# Patient Record
Sex: MALE | Race: OTHER | NOT HISPANIC OR LATINO | Employment: FULL TIME | URBAN - METROPOLITAN AREA
[De-identification: names, ages, dates, MRNs, and addresses within clinical notes are randomized per-mention and may not be internally consistent; named-entity substitution may affect disease eponyms.]

---

## 2017-02-17 ENCOUNTER — HOSPITAL ENCOUNTER (OUTPATIENT)
Dept: NON INVASIVE DIAGNOSTICS | Facility: HOSPITAL | Age: 62
Discharge: HOME | End: 2017-02-17
Attending: INTERNAL MEDICINE
Payer: COMMERCIAL

## 2017-02-17 ENCOUNTER — TRANSCRIBE ORDERS (OUTPATIENT)
Dept: ADMINISTRATIVE | Facility: HOSPITAL | Age: 62
End: 2017-02-17

## 2017-02-17 DIAGNOSIS — E11.00 TYPE 2 DIABETES MELLITUS WITH HYPEROSMOLARITY WITHOUT COMA, UNSPECIFIED LONG TERM INSULIN USE STATUS: ICD-10-CM

## 2017-02-17 DIAGNOSIS — I10 ESSENTIAL HYPERTENSION, MALIGNANT: ICD-10-CM

## 2017-02-17 DIAGNOSIS — E78.5 HYPERLIPIDEMIA, UNSPECIFIED HYPERLIPIDEMIA TYPE: ICD-10-CM

## 2017-02-17 DIAGNOSIS — E78.5 HYPERLIPIDEMIA, UNSPECIFIED HYPERLIPIDEMIA TYPE: Primary | ICD-10-CM

## 2017-02-17 LAB
ATRIAL RATE: 50 BPM
P AXIS: 38 DEGREES
PR INTERVAL: 160 MS
QRS AXIS: 0 DEGREES
QRSD INTERVAL: 96 MS
QT INTERVAL: 460 MS
QTC INTERVAL: 419 MS
T WAVE AXIS: 17 DEGREES
VENTRICULAR RATE: 50 BPM

## 2017-02-17 PROCEDURE — 93005 ELECTROCARDIOGRAM TRACING: CPT

## 2017-11-07 ENCOUNTER — HOSPITAL ENCOUNTER (OUTPATIENT)
Dept: RADIOLOGY | Facility: HOSPITAL | Age: 62
Discharge: HOME | End: 2017-11-07
Attending: INTERNAL MEDICINE
Payer: COMMERCIAL

## 2017-11-07 ENCOUNTER — TRANSCRIBE ORDERS (OUTPATIENT)
Dept: ADMINISTRATIVE | Facility: HOSPITAL | Age: 62
End: 2017-11-07

## 2017-11-07 ENCOUNTER — APPOINTMENT (OUTPATIENT)
Dept: LAB | Facility: HOSPITAL | Age: 62
End: 2017-11-07
Attending: INTERNAL MEDICINE
Payer: COMMERCIAL

## 2017-11-07 DIAGNOSIS — R10.31 ABDOMINAL PAIN, RIGHT LOWER QUADRANT: ICD-10-CM

## 2017-11-07 DIAGNOSIS — K51.919 MILD CHRONIC ULCERATIVE COLITIS WITH COMPLICATION (HCC): ICD-10-CM

## 2017-11-07 DIAGNOSIS — K51.90 MILD CHRONIC ULCERATIVE COLITIS WITHOUT COMPLICATION (HCC): ICD-10-CM

## 2017-11-07 DIAGNOSIS — R10.31 ABDOMINAL PAIN, RIGHT LOWER QUADRANT: Primary | ICD-10-CM

## 2017-11-07 LAB
ALBUMIN SERPL BCP-MCNC: 3.7 G/DL (ref 3.5–5)
ALP SERPL-CCNC: 63 U/L (ref 46–116)
ALT SERPL W P-5'-P-CCNC: 23 U/L (ref 12–78)
ANION GAP SERPL CALCULATED.3IONS-SCNC: 7 MMOL/L (ref 4–13)
AST SERPL W P-5'-P-CCNC: 17 U/L (ref 5–45)
BACTERIA UR QL AUTO: ABNORMAL /HPF
BASOPHILS # BLD AUTO: 0.02 THOUSANDS/ΜL (ref 0–0.1)
BASOPHILS NFR BLD AUTO: 0 % (ref 0–1)
BILIRUB SERPL-MCNC: 0.79 MG/DL (ref 0.2–1)
BILIRUB UR QL STRIP: NEGATIVE
BUN SERPL-MCNC: 18 MG/DL (ref 5–25)
CALCIUM SERPL-MCNC: 9.1 MG/DL (ref 8.3–10.1)
CHLORIDE SERPL-SCNC: 102 MMOL/L (ref 100–108)
CLARITY UR: ABNORMAL
CO2 SERPL-SCNC: 32 MMOL/L (ref 21–32)
COLOR UR: YELLOW
CREAT SERPL-MCNC: 1.27 MG/DL (ref 0.6–1.3)
EOSINOPHIL # BLD AUTO: 0.35 THOUSAND/ΜL (ref 0–0.61)
EOSINOPHIL NFR BLD AUTO: 5 % (ref 0–6)
ERYTHROCYTE [DISTWIDTH] IN BLOOD BY AUTOMATED COUNT: 13.1 % (ref 11.6–15.1)
GFR SERPL CREATININE-BSD FRML MDRD: 61 ML/MIN/1.73SQ M
GLUCOSE SERPL-MCNC: 85 MG/DL (ref 65–140)
GLUCOSE UR STRIP-MCNC: NEGATIVE MG/DL
HCT VFR BLD AUTO: 44.8 % (ref 36.5–49.3)
HGB BLD-MCNC: 15.3 G/DL (ref 12–17)
HGB UR QL STRIP.AUTO: ABNORMAL
HYALINE CASTS #/AREA URNS LPF: ABNORMAL /LPF
KETONES UR STRIP-MCNC: NEGATIVE MG/DL
LEUKOCYTE ESTERASE UR QL STRIP: ABNORMAL
LYMPHOCYTES # BLD AUTO: 2.02 THOUSANDS/ΜL (ref 0.6–4.47)
LYMPHOCYTES NFR BLD AUTO: 28 % (ref 14–44)
MCH RBC QN AUTO: 31.3 PG (ref 26.8–34.3)
MCHC RBC AUTO-ENTMCNC: 34.2 G/DL (ref 31.4–37.4)
MCV RBC AUTO: 92 FL (ref 82–98)
MONOCYTES # BLD AUTO: 0.71 THOUSAND/ΜL (ref 0.17–1.22)
MONOCYTES NFR BLD AUTO: 10 % (ref 4–12)
NEUTROPHILS # BLD AUTO: 4.04 THOUSANDS/ΜL (ref 1.85–7.62)
NEUTS SEG NFR BLD AUTO: 57 % (ref 43–75)
NITRITE UR QL STRIP: NEGATIVE
NON-SQ EPI CELLS URNS QL MICRO: ABNORMAL /HPF
NRBC BLD AUTO-RTO: 0 /100 WBCS
PH UR STRIP.AUTO: 5.5 [PH] (ref 4.5–8)
PLATELET # BLD AUTO: 220 THOUSANDS/UL (ref 149–390)
PMV BLD AUTO: 10.8 FL (ref 8.9–12.7)
POTASSIUM SERPL-SCNC: 3.4 MMOL/L (ref 3.5–5.3)
PROT SERPL-MCNC: 8 G/DL (ref 6.4–8.2)
PROT UR STRIP-MCNC: NEGATIVE MG/DL
RBC # BLD AUTO: 4.89 MILLION/UL (ref 3.88–5.62)
RBC #/AREA URNS AUTO: ABNORMAL /HPF
SODIUM SERPL-SCNC: 141 MMOL/L (ref 136–145)
SP GR UR STRIP.AUTO: 1.02 (ref 1–1.03)
UROBILINOGEN UR QL STRIP.AUTO: 0.2 E.U./DL
WBC # BLD AUTO: 7.15 THOUSAND/UL (ref 4.31–10.16)
WBC #/AREA URNS AUTO: ABNORMAL /HPF

## 2017-11-07 PROCEDURE — 80053 COMPREHEN METABOLIC PANEL: CPT

## 2017-11-07 PROCEDURE — 36415 COLL VENOUS BLD VENIPUNCTURE: CPT

## 2017-11-07 PROCEDURE — 81001 URINALYSIS AUTO W/SCOPE: CPT | Performed by: INTERNAL MEDICINE

## 2017-11-07 PROCEDURE — 74177 CT ABD & PELVIS W/CONTRAST: CPT

## 2017-11-07 PROCEDURE — 85025 COMPLETE CBC W/AUTO DIFF WBC: CPT

## 2017-11-07 RX ADMIN — IOHEXOL 100 ML: 350 INJECTION, SOLUTION INTRAVENOUS at 19:17

## 2017-11-13 ENCOUNTER — TRANSCRIBE ORDERS (OUTPATIENT)
Dept: ADMINISTRATIVE | Facility: HOSPITAL | Age: 62
End: 2017-11-13

## 2017-11-13 DIAGNOSIS — N13.30 HYDRONEPHROSIS, UNSPECIFIED HYDRONEPHROSIS TYPE: Primary | ICD-10-CM

## 2017-11-14 ENCOUNTER — HOSPITAL ENCOUNTER (OUTPATIENT)
Dept: RADIOLOGY | Facility: HOSPITAL | Age: 62
Discharge: HOME | End: 2017-11-14
Attending: INTERNAL MEDICINE
Payer: COMMERCIAL

## 2017-11-14 DIAGNOSIS — N13.30 HYDRONEPHROSIS, UNSPECIFIED HYDRONEPHROSIS TYPE: ICD-10-CM

## 2017-11-14 PROCEDURE — 76770 US EXAM ABDO BACK WALL COMP: CPT

## 2017-11-20 ENCOUNTER — ALLSCRIPTS OFFICE VISIT (OUTPATIENT)
Dept: OTHER | Facility: OTHER | Age: 62
End: 2017-11-20

## 2017-11-20 LAB
BILIRUB UR QL STRIP: NORMAL
CLARITY UR: NORMAL
COLOR UR: YELLOW
GLUCOSE (HISTORICAL): NORMAL
HGB UR QL STRIP.AUTO: NORMAL
KETONES UR STRIP-MCNC: NORMAL MG/DL
LEUKOCYTE ESTERASE UR QL STRIP: NORMAL
NITRITE UR QL STRIP: NORMAL
PROT UR STRIP-MCNC: NORMAL MG/DL

## 2017-11-21 NOTE — CONSULTS
Assessment    1  Nephrolithiasis (592 0) (N20 0)   2     3  Non-smoker (V49 89) (Z78 9)   4  Social alcohol use (Z78 9)    Plan  Nephrolithiasis, SocHx: Non-smoker    · Urine Dip Non-Automated- POC; Status:Complete - Retrospective By ProtocolAuthorization;   Done: [id number] 02:02PM   Performed: In Office; [phone]; Last Updated Codi Guzman; 11/20/2017 2:03:15 PM;Ordered;SocHx: Sandie Angela By:Nga De Leon; Discussion/Summary  Discussion Summary:   My impression is 6 mm right proximal ureteral calculus  and benefits of cystoscopy with right ureteroscopy, holmium laser lithotripsy, right retrograde pyelography, and right ureteral stent insertion were discussed and reviewed  Informed consent was obtained  The patient understands that based on the proximal location of the stone it is possible that he may require right ureteral stent insertion followed by interval ureteroscopy  The patient was instructed to call if he were to develop worsening flank pain, nausea, vomiting, fever, or chills  Patient's Capacity to Self-Care: Patient is able to Self-Care  Goals and Barriers: The patient has the current Goals: To become stone free  The patent has the current Barriers: None  Self Referrals:   Self Referrals: No Ref By  Meng Richter      Chief Complaint  Chief Complaint Free Text Note Form: New patient consult for nephrolithiasis      History of Present Illness  HPI: Emily Syed is a 63year old male with R flank pain that started almost 2 weeks ago  This prompted a CT scan of the abdomen and pelvis performed on November 7, 2017  This revealed a 6 mm right proximal ureteral calculus with right-sided hydronephrosis  Approximately 1 week later a follow-up retroperitoneal ultrasound was obtained which showed the right proximal ureteral calculus within the same position  The patient states that he has occasional right-sided flank pain but that the pain has improved significantly   He denies passing the stone  He denies prior history of nephrolithiasis  medical and surgical history is remarkable for hernia repair, ACL/mcl repair, hypertension, hypercholesterolemia, glucose intolerance  surgical, family, and social histories were reviewed in [employer]  Review of Systems  Complete-Male Urology:  Constitutional: No fever or chills, feels well, no tiredness, no recent weight gain or weight loss  Respiratory: No complaints of shortness of breath, no wheezing, no cough, no SOB on exertion, no orthopnea or PND  Cardiovascular: No complaints of slow heart rate, no fast heart rate, no chest pain, no palpitations, no leg claudication, no lower extremity  Gastrointestinal: No complaints of abdominal pain, no constipation, no nausea or vomiting, no diarrhea or bloody stools  Genitourinary: Empty sensation-- and-- stream quality good, but-- as noted in HPI,-- no dysuria,-- no urinary hesitancy,-- no hematuria,-- no incontinence-- and-- no feelings of urinary urgency--   The patient presents with complaints of 2 episodes of nocturia  Musculoskeletal: No complaints of arthralgia, no myalgias, no joint swelling or stiffness, no limb pain or swelling  Integumentary: No complaints of skin rash or skin lesions, no itching, no skin wound, no dry skin  Hematologic/Lymphatic: No complaints of swollen glands, no swollen glands in the neck, does not bleed easily, no easy bruising  Neurological: No compliants of headache, no confusion, no convulsions, no numbness or tingling, no dizziness or fainting, no limb weakness, no difficulty walking  ROS Reviewed:   ROS reviewed  Active Problems  1  Nephrolithiasis (592 0) (N20 0)    Past Medical History  Active Problems And Past Medical History Reviewed: The active problems and past medical history were reviewed and updated today  Surgical History  Surgical History Reviewed: The surgical history was reviewed and updated today         Family History  Family History Reviewed: The family history was reviewed and updated today  Social History     ·    · Non-smoker (V49 89) (Z78 9)   · Social alcohol use (Z78 9)  Social History Reviewed: The social history was reviewed and updated today  The social history was reviewed and is unchanged  Current Meds   1  Bystolic 10 MG Oral Tablet; Therapy: (Recorded:20Nov2017) to Recorded   2  Chlorthalidone 25 MG Oral Tablet; Therapy: (Recorded:20Nov2017) to Recorded   3  Lipitor 20 MG Oral Tablet; TAKE 1 TABLET DAILY; Therapy: [id number] to (Last Rx:[id number])  Requested for: [id number] Ordered   4  Lisinopril 20 MG Oral Tablet; Therapy: (Recorded:20Nov2017) to Recorded   5  MetFORMIN HCl - 500 MG Oral Tablet; Therapy: (Recorded:20Nov2017) to Recorded   6  NIFEdipine ER Osmotic Release 30 MG Oral Tablet Extended Release 24 Hour; Therapy: (Recorded:20Nov2017) to Recorded  Medication List Reviewed: The medication list was reviewed and updated today  Allergies  1  Penicillins    Vitals  Vital Signs    Recorded: 20Nov2017 01:58PM   Heart Rate 64   Systolic 868   Diastolic 78   Height 5 ft 10 in   Weight 199 lb 7 oz   BMI Calculated 28 62   BSA Calculated 2 08       Physical Exam   Additional Exam:  On examination he is in no acute distress  Lungs are clear  Cardiac is regular  Abdomen is soft nontender nondistended   examination reveals no CVA tenderness  Skin is warm  Extremities without edema   Neurologic is grossly intact and nonfocal  Gait normal  Affect normal       Results/Data  AUA Symptom Score [id number] 02:04PM User, s     Test Name Result Flag Reference   AUA Symptom Score (for prostate disease) 2       Incomplete emptying: Not at all (0) Frequency: Not at all (0) Intermittency: Not at all (0) Urgency: Not at all (0) Weak-stream: Not at all (0) Straining: Not at all (0) Nocturia: Less than half the time (2)   AUA Symptom Score (for prostate disease) - Quality of Life Due to Urinary Symptoms Unhappy AUA Symptom Score (for prostate disease) - Score Category Mild       Urine Dip Non-Automated- POC [id number] 02:02PM [facility]     Test Name Result Flag Reference   Color Yellow       Clarity Transparent     Leukocytes -     Nitrite -     Blood large     Bilirubin -     Protein -     Ketone -     Glucose -       US KIDNEY AND BLADDER [id number] 05:05PM [facility], Provider   Test ordered by: Kem Shepard     Test Name Result Flag Reference   US KIDNEY AND BLADDER (Report)       RENAL ULTRASOUND   INDICATION: N13 30: Unspecified hydronephrosis  History taken directly from the electronic ordering system  Known right ureteral stone  COMPARISON: CT scan 11/7/2017  TECHNIQUE:  Ultrasound of the retroperitoneum was performed with a curvilinear transducer utilizing volumetric sweeps and still imaging techniques  FINDINGS:   KIDNEYS:  Symmetric and normal size  Right kidney: 11 2 cm  Normal echogenicity and contour  No suspicious masses detected  Mild hydronephrosis again identified  No shadowing calculi  No perinephric fluid collections  Left kidney: 11 5 cm  Normal echogenicity and contour  No suspicious masses detected  No hydronephrosis  No shadowing calculi  No perinephric fluid collections  URETERS:  8mm calculus again identified in the mid ureter  BLADDER:   Normally distended  No focal thickening or mass lesions  Bilateral ureteral jets detected  IMPRESSION:   No significant change in the mildly obstructing right midureteral calculus  Workstation performed: [id number]   Signed by:  Katerine Grayson MD  11/16/17     * CT ABDOMEN PELVIS W CONTRAST [id number] 06:09PM [facility], Provider   Test ordered by: Kem Shepard     Test Name Result Flag Reference   CT ABDOMEN PELVIS W CONTRAST (Report)       CT ABDOMEN AND PELVIS WITH IV CONTRAST   INDICATION: Ulcerative colitis   COMPARISON: November 9, 2015   TECHNIQUE: CT examination of the abdomen and pelvis was performed  Reformatted images were created in axial, sagittal, and coronal planes  Radiation dose length product (DLP) for this visit: 547 47 mGy-cm   This examination, like all CT scans performed in the [facility], was performed utilizing techniques to minimize radiation dose exposure, including the use of iterative  reconstruction and automated exposure control  IV Contrast: 100 mL of iohexol (OMNIPAQUE)     Enteric Contrast: Enteric contrast was not administered  FINDINGS:   ABDOMEN   LOWER CHEST: Lingular atelectasis or scarring  Medial right basilar subsegmental atelectasis or scarring  LIVER/BILIARY TREE: Unremarkable  GALLBLADDER: No calcified gallstones  No pericholecystic inflammatory change  SPLEEN: Unremarkable  PANCREAS: Unremarkable  ADRENAL GLANDS: Unremarkable  KIDNEYS/URETERS: 6 mm right proximal ureteral obstructing calculus causing right hydroureteronephrosis  Mild right urothelial thickening could relate to infectious/inflammatory process  STOMACH AND BOWEL: Sigmoid diverticulosis  APPENDIX: No findings to suggest appendicitis  ABDOMINOPELVIC CAVITY: No ascites or free intraperitoneal air  No lymphadenopathy  VESSELS: Unremarkable for patient's age  PELVIS   REPRODUCTIVE ORGANS: Unremarkable for patient's age  URINARY BLADDER: Nondistended and inadequately evaluated  ABDOMINAL WALL/INGUINAL REGIONS: Unremarkable  OSSEOUS STRUCTURES: Lower lumbar spine degenerative changes and bilateral hip joint degenerative changes  No acute fracture or destructive osseous lesion  IMPRESSION:   6 mm right obstructing proximal ureteral calculus causing right hydroureteronephrosis  Mild right urothelial thickening could relate to infectious/inflammatory process  Sigmoid diverticulosis      Workstation performed: [id number]   Signed by:  Darrin Renteria MD  11/7/17       Signatures   Electronically signed by : Terri Monroe MD; Nov 20 2017  2:52PM EST (Author)

## 2017-11-24 RX ORDER — LISINOPRIL 20 MG/1
20 TABLET ORAL DAILY
Status: ON HOLD | COMMUNITY
End: 2018-10-29

## 2017-11-24 RX ORDER — ATORVASTATIN CALCIUM 10 MG/1
10 TABLET, FILM COATED ORAL DAILY
COMMUNITY

## 2017-11-24 RX ORDER — TRAMADOL HYDROCHLORIDE 50 MG/1
50 TABLET ORAL EVERY 6 HOURS PRN
Status: ON HOLD | COMMUNITY
End: 2018-10-29

## 2017-11-24 RX ORDER — NEBIVOLOL 10 MG/1
10 TABLET ORAL DAILY
COMMUNITY

## 2017-11-24 RX ORDER — NIFEDIPINE 30 MG/1
30 TABLET, FILM COATED, EXTENDED RELEASE ORAL DAILY
COMMUNITY

## 2017-11-24 RX ORDER — CHLORTHALIDONE 25 MG/1
25 TABLET ORAL DAILY
COMMUNITY

## 2017-11-24 NOTE — PRE-PROCEDURE INSTRUCTIONS
Pre-Surgery Instructions:   Medication Instructions    Aspirin 162 5 MG CP24 Patient was instructed per "E-Preop"    atorvastatin (LIPITOR) 10 mg tablet Patient was instructed per "E-Preop"    chlorthalidone 25 mg tablet Patient was instructed per "E-Preop"    lisinopril (ZESTRIL) 20 mg tablet Patient was instructed per "E-Preop"    metFORMIN (GLUCOPHAGE) 500 mg tablet Patient was instructed per "E-Preop"    nebivolol (BYSTOLIC) 10 mg tablet Patient was instructed per "E-Preop"    NIFEdipine ER (ADALAT CC) 30 MG 24 hr tablet Patient was instructed per "E-Preop"    traMADol (ULTRAM) 50 mg tablet Patient was instructed per "E-Preop"     Medication Instruction (ACE/ARB - Blood Pressure Medication)    Please do not take this medication on the morning of your day of surgery  Please restart your medications as soon as clinically feasible  Lisinopril 20 MG Oral Tablet            Medication Instruction (Aspirin - Blood Thinners)    Your surgeon may have you stop aspirin up to a week early if you are having intracranial, spine, middle ear, posterior eye or prostate surgery  If not please continue to take this medication on your normal schedule  If you take this in the morning you may do so with a sip of water  aspirin          Medication Instruction (Beta Blocker)    Please continue to take this medication on your normal schedule  If this is an oral medication and you take in the morning, you may do so with a sip of water  Bystolic 10 MG Oral Tablet          Calcium Blocker (Blood Pressure Medication)    Please continue to take this medication on your normal schedule  If this is an oral medication and you take in the morning, you may do so with a sip of water  NIFEdipine          Medication Instruction (Diabetic Medication)    If you are taking long-acting insulin (i e  glargine or Lantus) please only take one-half your usual nighttime dose the night before surgery   If you are taking short-acting insulin or oral diabetes medications, then omit the morning dose the day of surgery  If taking metformin (i e  Glucophage), discontinue this medication for 24 hours prior to surgery  If you have an insulin pump, continue pump the morning of surgery at a basal rate only  metFORMIN        Medication Instruction (Diuretics - Water Pills)    Please continue the following medications up to the evening before surgery, but do not take it on the day of surgery  chlorthalidone          NPO Instructions    Please do not eat or drink anything prior to your surgery as follows: For AM Surgery times = stop at midnight the night before  For PM Surgery times = Midnight to 8AM clear liquids only (Jello, broth, 7up, Sprite, apple juice, black coffee, black tea, Gatorade)  If you are supposed to take any of your medications, do so with a sip of water  Failure to follow these instructions can lead to an increased risk of lung complications and may result in a delay or cancellation of your procedure  If you have any questions, contact your institution for further instructions  Medical Procedure Risk        Medication Instruction (Cholesterol Medication)    Please continue to take this medication on your normal schedule  If this is an oral medication and you take in the morning, you may do so with a sip of water  atorvastatin (Lipitor)          Medication Instruction (Tramadol)    Please continue to take this medication on your normal schedule  If this is an oral medication and you take in the morning, you may do so with a sip of water           traMADol (Ultram)        Accept All Complete All   Last signed: Never    Request Signature   Add New Task Show Removed Tasks

## 2017-11-24 NOTE — PRE-PROCEDURE INSTRUCTIONS
Pre-Surgery Instructions:   Medication Instructions    Aspirin 162 5 MG CP24 Patient was instructed per "E-Preop"    atorvastatin (LIPITOR) 10 mg tablet Patient was instructed per "E-Preop"    chlorthalidone 25 mg tablet Patient was instructed per "E-Preop"    lisinopril (ZESTRIL) 20 mg tablet Patient was instructed per "E-Preop"    metFORMIN (GLUCOPHAGE) 500 mg tablet Patient was instructed per "E-Preop"    nebivolol (BYSTOLIC) 10 mg tablet Patient was instructed per "E-Preop"    NIFEdipine ER (ADALAT CC) 30 MG 24 hr tablet Patient was instructed per "E-Preop"    traMADol (ULTRAM) 50 mg tablet Patient was instructed per "E-Preop"

## 2017-11-28 ENCOUNTER — APPOINTMENT (OUTPATIENT)
Dept: RADIOLOGY | Facility: HOSPITAL | Age: 62
End: 2017-11-28
Attending: UROLOGY
Payer: COMMERCIAL

## 2017-11-28 ENCOUNTER — ANESTHESIA (OUTPATIENT)
Dept: PERIOP | Facility: HOSPITAL | Age: 62
End: 2017-11-28
Payer: COMMERCIAL

## 2017-11-28 ENCOUNTER — HOSPITAL ENCOUNTER (OUTPATIENT)
Facility: HOSPITAL | Age: 62
Setting detail: OUTPATIENT SURGERY
Discharge: HOME | End: 2017-11-28
Attending: UROLOGY | Admitting: UROLOGY
Payer: COMMERCIAL

## 2017-11-28 ENCOUNTER — HOSPITAL ENCOUNTER (OUTPATIENT)
Dept: RADIOLOGY | Facility: HOSPITAL | Age: 62
Discharge: HOME | End: 2017-11-28
Payer: COMMERCIAL

## 2017-11-28 ENCOUNTER — ANESTHESIA EVENT (OUTPATIENT)
Dept: PERIOP | Facility: HOSPITAL | Age: 62
End: 2017-11-28
Payer: COMMERCIAL

## 2017-11-28 VITALS
DIASTOLIC BLOOD PRESSURE: 112 MMHG | TEMPERATURE: 98.3 F | RESPIRATION RATE: 18 BRPM | OXYGEN SATURATION: 98 % | WEIGHT: 199 LBS | SYSTOLIC BLOOD PRESSURE: 198 MMHG | HEIGHT: 71 IN | HEART RATE: 48 BPM | BODY MASS INDEX: 27.86 KG/M2

## 2017-11-28 DIAGNOSIS — N20.0 CALCULUS OF KIDNEY: ICD-10-CM

## 2017-11-28 LAB — GLUCOSE SERPL-MCNC: 92 MG/DL (ref 65–140)

## 2017-11-28 PROCEDURE — 82948 REAGENT STRIP/BLOOD GLUCOSE: CPT

## 2017-11-28 PROCEDURE — 74000 HB X-RAY EXAM OF ABDOMEN (SINGLE ANTEROPOSTERIOR VIEW): CPT

## 2017-11-28 PROCEDURE — C2617 STENT, NON-COR, TEM W/O DEL: HCPCS | Performed by: UROLOGY

## 2017-11-28 PROCEDURE — 88300 SURGICAL PATH GROSS: CPT | Performed by: UROLOGY

## 2017-11-28 PROCEDURE — 74420 UROGRAPHY RTRGR +-KUB: CPT

## 2017-11-28 PROCEDURE — 82360 CALCULUS ASSAY QUANT: CPT | Performed by: UROLOGY

## 2017-11-28 PROCEDURE — 87086 URINE CULTURE/COLONY COUNT: CPT | Performed by: UROLOGY

## 2017-11-28 PROCEDURE — C1769 GUIDE WIRE: HCPCS | Performed by: UROLOGY

## 2017-11-28 DEVICE — STENT URETERAL 6 FR 26CM INLAY OPTIMA: Type: IMPLANTABLE DEVICE | Site: URETER | Status: FUNCTIONAL

## 2017-11-28 RX ORDER — LIDOCAINE HYDROCHLORIDE 10 MG/ML
INJECTION, SOLUTION INFILTRATION; PERINEURAL AS NEEDED
Status: DISCONTINUED | OUTPATIENT
Start: 2017-11-28 | End: 2017-11-28 | Stop reason: SURG

## 2017-11-28 RX ORDER — MEPERIDINE HYDROCHLORIDE 25 MG/ML
25 INJECTION INTRAMUSCULAR; INTRAVENOUS; SUBCUTANEOUS AS NEEDED
Status: DISCONTINUED | OUTPATIENT
Start: 2017-11-28 | End: 2017-11-28 | Stop reason: HOSPADM

## 2017-11-28 RX ORDER — CIPROFLOXACIN 2 MG/ML
400 INJECTION, SOLUTION INTRAVENOUS ONCE
Status: COMPLETED | OUTPATIENT
Start: 2017-11-28 | End: 2017-11-28

## 2017-11-28 RX ORDER — PROPOFOL 10 MG/ML
INJECTION, EMULSION INTRAVENOUS AS NEEDED
Status: DISCONTINUED | OUTPATIENT
Start: 2017-11-28 | End: 2017-11-28 | Stop reason: SURG

## 2017-11-28 RX ORDER — ONDANSETRON 2 MG/ML
INJECTION INTRAMUSCULAR; INTRAVENOUS AS NEEDED
Status: DISCONTINUED | OUTPATIENT
Start: 2017-11-28 | End: 2017-11-28 | Stop reason: SURG

## 2017-11-28 RX ORDER — FENTANYL CITRATE 50 UG/ML
INJECTION, SOLUTION INTRAMUSCULAR; INTRAVENOUS AS NEEDED
Status: DISCONTINUED | OUTPATIENT
Start: 2017-11-28 | End: 2017-11-28 | Stop reason: SURG

## 2017-11-28 RX ORDER — CIPROFLOXACIN 500 MG/1
500 TABLET, FILM COATED ORAL 2 TIMES DAILY
Qty: 10 TABLET | Refills: 0 | Status: SHIPPED | OUTPATIENT
Start: 2017-11-28 | End: 2017-12-03

## 2017-11-28 RX ORDER — HYDROCODONE BITARTRATE AND ACETAMINOPHEN 5; 325 MG/1; MG/1
2 TABLET ORAL EVERY 4 HOURS PRN
Status: DISCONTINUED | OUTPATIENT
Start: 2017-11-28 | End: 2017-11-28 | Stop reason: HOSPADM

## 2017-11-28 RX ORDER — METOCLOPRAMIDE HYDROCHLORIDE 5 MG/ML
10 INJECTION INTRAMUSCULAR; INTRAVENOUS ONCE AS NEEDED
Status: DISCONTINUED | OUTPATIENT
Start: 2017-11-28 | End: 2017-11-28 | Stop reason: HOSPADM

## 2017-11-28 RX ORDER — HYDROCODONE BITARTRATE AND ACETAMINOPHEN 5; 325 MG/1; MG/1
2 TABLET ORAL EVERY 6 HOURS PRN
Qty: 15 TABLET | Refills: 0 | Status: SHIPPED | OUTPATIENT
Start: 2017-11-28 | End: 2017-12-08

## 2017-11-28 RX ORDER — MAGNESIUM HYDROXIDE 1200 MG/15ML
LIQUID ORAL AS NEEDED
Status: DISCONTINUED | OUTPATIENT
Start: 2017-11-28 | End: 2017-11-28 | Stop reason: HOSPADM

## 2017-11-28 RX ORDER — SODIUM CHLORIDE, SODIUM LACTATE, POTASSIUM CHLORIDE, CALCIUM CHLORIDE 600; 310; 30; 20 MG/100ML; MG/100ML; MG/100ML; MG/100ML
20 INJECTION, SOLUTION INTRAVENOUS CONTINUOUS
Status: DISCONTINUED | OUTPATIENT
Start: 2017-11-28 | End: 2017-11-28 | Stop reason: HOSPADM

## 2017-11-28 RX ORDER — FENTANYL CITRATE/PF 50 MCG/ML
25 SYRINGE (ML) INJECTION
Status: DISCONTINUED | OUTPATIENT
Start: 2017-11-28 | End: 2017-11-28 | Stop reason: HOSPADM

## 2017-11-28 RX ORDER — MIDAZOLAM HYDROCHLORIDE 1 MG/ML
INJECTION INTRAMUSCULAR; INTRAVENOUS AS NEEDED
Status: DISCONTINUED | OUTPATIENT
Start: 2017-11-28 | End: 2017-11-28 | Stop reason: SURG

## 2017-11-28 RX ORDER — LISINOPRIL 20 MG/1
20 TABLET ORAL DAILY
Status: COMPLETED | OUTPATIENT
Start: 2017-11-28 | End: 2017-11-28

## 2017-11-28 RX ADMIN — FENTANYL CITRATE 50 MCG: 50 INJECTION, SOLUTION INTRAMUSCULAR; INTRAVENOUS at 17:16

## 2017-11-28 RX ADMIN — SODIUM CHLORIDE, SODIUM LACTATE, POTASSIUM CHLORIDE, AND CALCIUM CHLORIDE: .6; .31; .03; .02 INJECTION, SOLUTION INTRAVENOUS at 17:05

## 2017-11-28 RX ADMIN — ONDANSETRON 4 MG: 2 INJECTION INTRAMUSCULAR; INTRAVENOUS at 17:18

## 2017-11-28 RX ADMIN — LIDOCAINE HYDROCHLORIDE 40 MG: 10 INJECTION, SOLUTION INFILTRATION; PERINEURAL at 17:13

## 2017-11-28 RX ADMIN — HYDROCODONE BITARTRATE AND ACETAMINOPHEN 1 TABLET: 5; 325 TABLET ORAL at 19:06

## 2017-11-28 RX ADMIN — HYDROCODONE BITARTRATE AND ACETAMINOPHEN 1 TABLET: 5; 325 TABLET ORAL at 19:50

## 2017-11-28 RX ADMIN — CIPROFLOXACIN 400 MG: 2 INJECTION, SOLUTION INTRAVENOUS at 16:48

## 2017-11-28 RX ADMIN — LISINOPRIL 20 MG: 20 TABLET ORAL at 20:05

## 2017-11-28 RX ADMIN — FENTANYL CITRATE 25 MCG: 50 INJECTION, SOLUTION INTRAMUSCULAR; INTRAVENOUS at 17:29

## 2017-11-28 RX ADMIN — SODIUM CHLORIDE, SODIUM LACTATE, POTASSIUM CHLORIDE, AND CALCIUM CHLORIDE 20 ML/HR: .6; .31; .03; .02 INJECTION, SOLUTION INTRAVENOUS at 13:07

## 2017-11-28 RX ADMIN — FENTANYL CITRATE 25 MCG: 50 INJECTION, SOLUTION INTRAMUSCULAR; INTRAVENOUS at 17:43

## 2017-11-28 RX ADMIN — MIDAZOLAM HYDROCHLORIDE 2 MG: 1 INJECTION, SOLUTION INTRAMUSCULAR; INTRAVENOUS at 17:05

## 2017-11-28 RX ADMIN — DEXAMETHASONE SODIUM PHOSPHATE 4 MG: 10 INJECTION INTRAMUSCULAR; INTRAVENOUS at 17:18

## 2017-11-28 RX ADMIN — PROPOFOL 200 MG: 10 INJECTION, EMULSION INTRAVENOUS at 17:13

## 2017-11-28 NOTE — ANESTHESIA PREPROCEDURE EVALUATION
Review of Systems/Medical History  Patient summary reviewed  Chart reviewed  No history of anesthetic complications     Cardiovascular  Hyperlipidemia, Hypertension ,    Pulmonary  Negative pulmonary ROS ,        GI/Hepatic  Negative GI/hepatic ROS          Kidney stones,        Endo/Other  Diabetes well controlled Oral agent,      GYN       Hematology  Negative hematology ROS      Musculoskeletal  Negative musculoskeletal ROS        Neurology  Negative neurology ROS      Psychology   Negative psychology ROS            Physical Exam    Airway    Mallampati score: II  TM Distance: >3 FB  Neck ROM: full     Dental   No notable dental hx     Cardiovascular      Pulmonary      Other Findings        Anesthesia Plan  ASA Score- 2       Anesthesia Type- general with ASA Monitors  Additional Monitors:   Airway Plan: LMA  Induction- intravenous  Informed Consent- Anesthetic plan and risks discussed with patient  I personally reviewed this patient with the CRNA  Discussed and agreed on the Anesthesia Plan with the CRNA  Manuela Baig

## 2017-11-28 NOTE — OP NOTE
OPERATIVE REPORT  PATIENT NAME: Esteban Weber    :  1955  MRN: [id number]  Pt Location:  CYSTO ROOM 01    SURGERY DATE: 2017    Surgeon(s) and Role:     Jamison Carrel, MD - Primary    Preop Diagnosis:  Calculus of kidney [N20 0]    Post-Op Diagnosis Codes:     * Calculus of kidney [N20 0]    Procedure(s) (LRB):  CYSTOSCOPY URETEROSCOPY WITH LITHOTRIPSY HOLMIUM LASER, RETROGRADE PYELOGRAM AND INSERTION STENT URETERAL (Right)    Specimen(s):  ID Type Source Tests Collected by Time Destination   1 : right ureteral stone Tissue Other TISSUE EXAM Minda Murphy MD 2017 1740    A :  Urine Urine, Cystoscopic URINE CULTURE Minda Murphy MD 2017 1728        Estimated Blood Loss:   Minimal    Drains:  Right 26-6 Spanish double-J ureteral stent with string in place       Anesthesia Type:   General    Operative Indications:  Calculus of kidney [N20 0]  Calculus right mid ureter    Operative Findings:  6 mm calculus identified and right mid to distal ureter  [address] and removed  Complications:   None      Procedure Description: Esteban Weber is a 63y o -year-old male  with a history of a right 6 mm ureteral calculus  The stone is not well visualized on KUB based on its location overlying the pelvic brim  The patient continues to be symptomatic with right-sided flank pain  Risk and benefits of ureteroscopy were discussed and reviewed  Informed consent was obtained  The patient was brought to the operating room on 2017  After the smooth induction of general LMA anesthesia, the patient was placed in the dorsal lithotomy position  His genitalia was prepped and draped in a sterile fashion  Intravenous antibiotics were administered  A timeout was performed with all members of the operative team confirmed the patient's identity, procedure to be performed, and laterality of the case  A 22 Spanish rigid cystoscope with 30° lens was inserted   The bladder was thoroughly inspected  It was no evidence of mucosal abnormalities or lesions  There were no calculi identified  Attention was focused on the right ureteral orifice  A 5 Congolese open-ended catheter was inserted and a right retrograde pyelogram was performed  A filling defect in the mid to distal right ureter was identified consistent with the stone  A wire was passed proximal to the stone and secured as a safety  A 12 Congolese Schultz catheter was inserted for continuous bladder drainage  A long semirigid ureteroscope was then inserted adjacent to the wire  The stone was identified and was engaged and decimated with a 400 µ holmium laser fiber  The fragments were removed with a dimension ZeroTip basket  The ureteroscope was then repassed multiple times to ensure that the ureter was free and clear of any residual stones  Additional contrast was injected as a roadmap for stent insertion  The ureteroscope and Schultz catheter were then removed  The wire was backloaded through the cystoscope  The cystoscope was repassed into the bladder  A 26-6 Congolese right double-J ureteral stent was then placed without difficulty  The proximal coil was appreciated in the right renal pelvis and the distal coil was visualized within the bladder  The bladder was emptied and the cystoscope was removed  A string was left in place and secured to the dorsum of the phallus with Tegaderm  Overall the patient tolerated the procedure well and were no complications  The patient was extubated in the operating room and transferred to the PACU in stable condition at the conclusion of the case      Patient Disposition:  PACU stable and extubated    SIGNATURE: Harris Peralta MD  DATE: November 28, 2017  TIME: 5:51 PM

## 2017-11-28 NOTE — ANESTHESIA POSTPROCEDURE EVALUATION
Post-Op Assessment Note      CV Status:  Stable    Mental Status:  Awake    Hydration Status:  Stable    PONV Controlled:  Controlled    Airway Patency:  Patent    Post Op Vitals Reviewed: Yes          Staff: Anesthesiologist, CRNA           BP      Temp      Pulse    Resp      SpO2

## 2017-11-28 NOTE — DISCHARGE INSTRUCTIONS
Today you underwent right-sided ureteroscopy  I was able to remove her stone  You have a right ureteral stent in place  On Friday morning, remove the tape, pull the string, and remove the stent  Call for follow-up with Dr Chano Salcido in the next 8-12 weeks  [phone]  [phone]    Ureteroscopy   WHAT YOU NEED TO KNOW:   A ureteroscopy is a procedure to examine in the inside of your urinary tract, which includes your urethra, bladder, ureters, and kidneys  A ureteroscope is a small, thin tube with a light and camera on the end  Ureteroscopy can help your healthcare provider diagnose and treat problems in your urinary tract, such as kidney stones  DISCHARGE INSTRUCTIONS:   Medicine:   · Antibiotics  may be given to treat or prevent an infection  · Take your medicine as directed  Contact your healthcare provider if you think your medicine is not helping or if you have side effects  Tell him or her if you are allergic to any medicine  Keep a list of the medicines, vitamins, and herbs you take  Include the amounts, and when and why you take them  Bring the list or the pill bottles to follow-up visits  Carry your medicine list with you in case of an emergency  Follow up with your healthcare provider as directed:  Write down your questions so you remember to ask them during your visits  Drink liquids as directed  Liquids can help prevent kidney stones and urinary tract infections  Drink water and limit the amount of caffeine you drink  Caffeine may be found in coffee, tea, soda, sports drinks, and foods  Ask your healthcare provider how much liquid to drink each day  Contact your healthcare provider if:   · You have a fever  · You cannot urinate  · You have blood in your urine  · You are vomiting  · You have pain in your abdomen or side  · You have questions or concerns about your condition or care    © 2017 [employer] Information is for End User's use only and may not be sold, redistributed or otherwise used for commercial purposes  All illustrations and images included in CareNotes® are the copyrighted property of [employer]  or [address]  The above information is an  only  It is not intended as medical advice for individual conditions or treatments  Talk to your doctor, nurse or pharmacist before following any medical regimen to see if it is safe and effective for you

## 2017-11-29 NOTE — PERIOPERATIVE NURSING NOTE
Dr Windy Link on call called to notify of bp 206/121, pt in pain, just gave 2nd vicodin, hx hypertension on sveral meds  To give lisinopril 20 mg po now and send pt home to take rest of bp meds   bp prior to d/c 198/112, ok to d/c per dr Alf Santana

## 2017-11-30 ENCOUNTER — LAB REQUISITION (OUTPATIENT)
Dept: LAB | Facility: HOSPITAL | Age: 62
End: 2017-11-30
Payer: COMMERCIAL

## 2017-11-30 DIAGNOSIS — N20.0 CALCULUS OF KIDNEY: ICD-10-CM

## 2017-11-30 LAB — BACTERIA UR CULT: NORMAL

## 2017-12-01 ENCOUNTER — GENERIC CONVERSION - ENCOUNTER (OUTPATIENT)
Dept: OTHER | Facility: OTHER | Age: 62
End: 2017-12-01

## 2017-12-11 LAB
CA PHOS MFR STONE: 3 %
COLOR STONE: NORMAL
COM MFR STONE: 97 %
COMMENT-STONE3: NORMAL
COMPOSITION: NORMAL
LABORATORY COMMENT REPORT: NORMAL
NIDUS STONE QL: NORMAL
PHOTO: NORMAL
SIZE STONE: NORMAL MM
STONE ANALYSIS-IMP: NORMAL
WT STONE: 23.8 MG

## 2018-01-08 ENCOUNTER — TRANSCRIBE ORDERS (OUTPATIENT)
Dept: RADIOLOGY | Facility: HOSPITAL | Age: 63
End: 2018-01-08

## 2018-01-08 ENCOUNTER — TRANSCRIBE ORDERS (OUTPATIENT)
Dept: LAB | Facility: HOSPITAL | Age: 63
End: 2018-01-08

## 2018-01-08 ENCOUNTER — GENERIC CONVERSION - ENCOUNTER (OUTPATIENT)
Dept: OTHER | Facility: OTHER | Age: 63
End: 2018-01-08

## 2018-01-08 ENCOUNTER — HOSPITAL ENCOUNTER (OUTPATIENT)
Dept: RADIOLOGY | Facility: HOSPITAL | Age: 63
Discharge: HOME | End: 2018-01-08
Attending: INTERNAL MEDICINE
Payer: COMMERCIAL

## 2018-01-08 ENCOUNTER — APPOINTMENT (OUTPATIENT)
Dept: LAB | Facility: HOSPITAL | Age: 63
End: 2018-01-08
Attending: INTERNAL MEDICINE
Payer: COMMERCIAL

## 2018-01-08 DIAGNOSIS — M54.17 LUMBOSACRAL RADICULOPATHY: Primary | ICD-10-CM

## 2018-01-08 DIAGNOSIS — E78.5 HYPERLIPIDEMIA, UNSPECIFIED HYPERLIPIDEMIA TYPE: ICD-10-CM

## 2018-01-08 DIAGNOSIS — E78.5 HYPERLIPIDEMIA, UNSPECIFIED HYPERLIPIDEMIA TYPE: Primary | ICD-10-CM

## 2018-01-08 DIAGNOSIS — M54.17 LUMBOSACRAL RADICULOPATHY: ICD-10-CM

## 2018-01-08 DIAGNOSIS — E11.8 DIABETIC COMPLICATION (HCC): ICD-10-CM

## 2018-01-08 LAB
25(OH)D3 SERPL-MCNC: 19.4 NG/ML (ref 30–100)
ALBUMIN SERPL BCP-MCNC: 3.5 G/DL (ref 3.5–5)
ALP SERPL-CCNC: 64 U/L (ref 46–116)
ALT SERPL W P-5'-P-CCNC: 36 U/L (ref 12–78)
ANION GAP SERPL CALCULATED.3IONS-SCNC: 7 MMOL/L (ref 4–13)
AST SERPL W P-5'-P-CCNC: 25 U/L (ref 5–45)
BACTERIA UR QL AUTO: NORMAL /HPF
BASOPHILS # BLD AUTO: 0.02 THOUSANDS/ΜL (ref 0–0.1)
BASOPHILS NFR BLD AUTO: 0 % (ref 0–1)
BILIRUB SERPL-MCNC: 0.85 MG/DL (ref 0.2–1)
BILIRUB UR QL STRIP: NEGATIVE
BUN SERPL-MCNC: 20 MG/DL (ref 5–25)
CALCIUM SERPL-MCNC: 8.9 MG/DL (ref 8.3–10.1)
CHLORIDE SERPL-SCNC: 101 MMOL/L (ref 100–108)
CHOLEST SERPL-MCNC: 157 MG/DL (ref 50–200)
CLARITY UR: CLEAR
CO2 SERPL-SCNC: 29 MMOL/L (ref 21–32)
COLOR UR: YELLOW
CREAT SERPL-MCNC: 0.96 MG/DL (ref 0.6–1.3)
EOSINOPHIL # BLD AUTO: 0.62 THOUSAND/ΜL (ref 0–0.61)
EOSINOPHIL NFR BLD AUTO: 9 % (ref 0–6)
ERYTHROCYTE [DISTWIDTH] IN BLOOD BY AUTOMATED COUNT: 12.6 % (ref 11.6–15.1)
EST. AVERAGE GLUCOSE BLD GHB EST-MCNC: 134 MG/DL
GFR SERPL CREATININE-BSD FRML MDRD: 86 ML/MIN/1.73SQ M
GLUCOSE P FAST SERPL-MCNC: 112 MG/DL (ref 65–99)
GLUCOSE UR STRIP-MCNC: NEGATIVE MG/DL
HBA1C MFR BLD: 6.3 % (ref 4.2–6.3)
HCT VFR BLD AUTO: 43.1 % (ref 36.5–49.3)
HDLC SERPL-MCNC: 41 MG/DL (ref 40–60)
HGB BLD-MCNC: 14.7 G/DL (ref 12–17)
HGB UR QL STRIP.AUTO: NEGATIVE
HYALINE CASTS #/AREA URNS LPF: NORMAL /LPF
KETONES UR STRIP-MCNC: NEGATIVE MG/DL
LDLC SERPL CALC-MCNC: 65 MG/DL (ref 0–100)
LEUKOCYTE ESTERASE UR QL STRIP: NEGATIVE
LYMPHOCYTES # BLD AUTO: 1.46 THOUSANDS/ΜL (ref 0.6–4.47)
LYMPHOCYTES NFR BLD AUTO: 21 % (ref 14–44)
MCH RBC QN AUTO: 30.8 PG (ref 26.8–34.3)
MCHC RBC AUTO-ENTMCNC: 34.1 G/DL (ref 31.4–37.4)
MCV RBC AUTO: 90 FL (ref 82–98)
MONOCYTES # BLD AUTO: 0.6 THOUSAND/ΜL (ref 0.17–1.22)
MONOCYTES NFR BLD AUTO: 8 % (ref 4–12)
NEUTROPHILS # BLD AUTO: 4.42 THOUSANDS/ΜL (ref 1.85–7.62)
NEUTS SEG NFR BLD AUTO: 62 % (ref 43–75)
NITRITE UR QL STRIP: NEGATIVE
NON-SQ EPI CELLS URNS QL MICRO: NORMAL /HPF
NRBC BLD AUTO-RTO: 0 /100 WBCS
PH UR STRIP.AUTO: 7.5 [PH] (ref 4.5–8)
PLATELET # BLD AUTO: 186 THOUSANDS/UL (ref 149–390)
PMV BLD AUTO: 10.4 FL (ref 8.9–12.7)
POTASSIUM SERPL-SCNC: 3.5 MMOL/L (ref 3.5–5.3)
PROT SERPL-MCNC: 7.2 G/DL (ref 6.4–8.2)
PROT UR STRIP-MCNC: NEGATIVE MG/DL
PSA SERPL-MCNC: 0.9 NG/ML (ref 0–4)
RBC # BLD AUTO: 4.77 MILLION/UL (ref 3.88–5.62)
RBC #/AREA URNS AUTO: NORMAL /HPF
SODIUM SERPL-SCNC: 137 MMOL/L (ref 136–145)
SP GR UR STRIP.AUTO: 1.01 (ref 1–1.03)
TRIGL SERPL-MCNC: 255 MG/DL
UROBILINOGEN UR QL STRIP.AUTO: 0.2 E.U./DL
VIT B12 SERPL-MCNC: 251 PG/ML (ref 100–900)
WBC # BLD AUTO: 7.13 THOUSAND/UL (ref 4.31–10.16)
WBC #/AREA URNS AUTO: NORMAL /HPF

## 2018-01-08 PROCEDURE — 82306 VITAMIN D 25 HYDROXY: CPT

## 2018-01-08 PROCEDURE — 36415 COLL VENOUS BLD VENIPUNCTURE: CPT

## 2018-01-08 PROCEDURE — 81001 URINALYSIS AUTO W/SCOPE: CPT | Performed by: INTERNAL MEDICINE

## 2018-01-08 PROCEDURE — 82607 VITAMIN B-12: CPT

## 2018-01-08 PROCEDURE — 80053 COMPREHEN METABOLIC PANEL: CPT

## 2018-01-08 PROCEDURE — 85025 COMPLETE CBC W/AUTO DIFF WBC: CPT

## 2018-01-08 PROCEDURE — 80061 LIPID PANEL: CPT

## 2018-01-08 PROCEDURE — 84153 ASSAY OF PSA TOTAL: CPT

## 2018-01-08 PROCEDURE — 72110 X-RAY EXAM L-2 SPINE 4/>VWS: CPT

## 2018-01-08 PROCEDURE — 83036 HEMOGLOBIN GLYCOSYLATED A1C: CPT

## 2018-01-14 VITALS
HEART RATE: 64 BPM | WEIGHT: 199.44 LBS | BODY MASS INDEX: 28.55 KG/M2 | DIASTOLIC BLOOD PRESSURE: 78 MMHG | SYSTOLIC BLOOD PRESSURE: 122 MMHG | HEIGHT: 70 IN

## 2018-01-15 NOTE — PROGRESS NOTES
Preliminary Nursing Report                Patient Information    Initial Encounter Entry Date:   2017 3:29 PM EST (Automated Transmission Automated Transmission)       Last Modified:   {Marlo Whitley}              Legal Name: RYLCHFCNE EQSVQ        Social Security Number:        YOB: 1955        Age (years): 63        Gender: M        Body Mass Index (BMI): 29 kg/m2        Height: 70 in  Weight: 199 lbs (90 kgs)           Address:   [place] [address]              Phone: [phone]   (consent to leave messages)        Email:        Ethnicity: Decline to State        Restorationism:        Marital Status:        Preferred Language: English        Race: Other Race                    Patient Insurance Information        Primary Insurance Information Carrier Name: {Primary  CarrierName}           Carrier Address:   {Primary  CarrierAddress}              Carrier Phone: {Primary  CarrierPhone}          Group Number: {Primary  GroupNumber}          Policy Number: {Primary  PolicyNumber}          Insured Name: {Primary  InsuredName}          Insured : {Primary  InsuredDOB}          Relationship to Insured: {Primary  RelationshiptoInsured}           Secondary Insurance Information Carrier Name: {Secondary  CarrierName}           Carrier Address:   {Secondary  CarrierAddress}              Carrier Phone: {Secondary  CarrierPhone}          Group Number: {Secondary  GroupNumber}          Policy Number: {Secondary  PolicyNumber}          Insured Name: {Secondary  InsuredName}          Insured : {Secondary  InsuredDOB}          Relationship to Insured: {Secondary  RelationshiptoInsured}                       Health Profile   Booking #:   Maritza Callejas #: [phone]               DOS: 2017    Surgery : CYSTOURETHROSCOPY, WITH URETEROSCOPY AND/OR PYELOSCOPY;    Add'l Procedures/Notes:     Surgery Risk: Minor          Precautions          Allergies    Penicillins Medications    Bystolic 10 MG Oral Tablet       Chlorthalidone 25 MG Oral Tablet       Lipitor 20 MG Oral Tablet       Lisinopril 20 MG Oral Tablet       MetFORMIN HCl - 500 MG Oral Tablet       NIFEdipine ER Osmotic Release 30 MG Oral Tablet Extended Release 24 Hour               Conditions    Nephrolithiasis               Family History    None             Surgical History    None             Social History           Non-smoker       Social alcohol use                               Patient Instructions       Medical Procedure Risk  NPO Instructions   The day before surgery it is recommended to have a light dinner at your usual time and you are allowed a light snack early in the evening  Do not eat anything heavy or eat a big meal after 7pm  Do not eat or drink anything after midnight prior to your surgery  If you are supposed to take any of your medications, do so with a sip of water  Failure to follow these instructions can lead to an increased risk of lung complications and may result in a delay or cancellation of your procedure  If you have any questions, contact your institution for further instructions  No candy, no gum, no mints, no chewing tobacco          Lisinopril 20 MG Oral Tablet  Medication Instruction (ACE/ARB - Blood Pressure Medication) 1  Please continue the following medications up to the evening before surgery, but do not take it on the day of surgery  Please restart your medications as soon as clinically feasible  Bystolic 10 MG Oral Tablet  Medication Instruction (Beta Blocker) 3, 2, c, 4, 6, 5  Please continue to take this medication on your normal schedule  If this is an oral medication and you take in the morning, you may do so with a sip of water  Testing Considerations       No recommendations for this classification  Consultations       No recommendations for this classification               Miscellaneous Questions         Question: Are you able to walk up a flight of stairs, walk up a hill or do heavy housework WITHOUT having chest pain or shortness of breath? Answer: YES                   Allergies/Conditions/Medications Not Found        The following were not recognized by our system when generating the recommendations  Please consider if this would impact any preoperative protocols  ?        ? Non-smoker       ? Social alcohol use       ? Chlorthalidone 25 MG Oral Tablet       ? Lipitor 20 MG Oral Tablet       ? MetFORMIN HCl - 500 MG Oral Tablet       ? NIFEdipine ER Osmotic Release 30 MG Oral Tablet Extended Release 24 Hour                  Appointment Information         Date:    11/28/2017        Location:    [facility]        Address:           Directions:                      Footnotes revision 14      ?? Denotes a free-text entry  Legal Disclaimer: Any and all recommendations and services provided herein are designed to assist in the preoperative care of the patient  Nothing contained herein is designed to replace, eliminate or alleviate the responsibility of the attending physician to supervise and determine the patient?s preoperative care and course of treatment  Failure to provide complete, accurate information may negatively impact the system?s ability to recommend the proper preoperative protocol  THE ATTENDING PHYSICIAN IS RESPONSIBLE TO REVIEW THE SUGGESTED PREOPERATIVE PROTOCOLS/COURSE OF TREATMENT AND PRESCRIBE THE FINAL COURSE OF PREOPERATIVE TREATMENT IN CONSULTATION WITH THE PATIENT  THE ePREOP SYSTEM AND ITS MATERIALS ARE PROVIDED ? AS IS? WITHOUT WARRANTY OF ANY KIND, EXPRESS OR IMPLIED, INCLUDING, BUT NOT LIMITED TO, WARRANTIES OF PERFORMANCE OR MERCHANTABILITY OR FITNESS FOR A PARTICULAR PURPOSE  PATIENT AND PHYSICIANS HEREBY AGREE THAT THEIR USE OF THE MATERIALS AND RESOURCES ACT AS A CONSENT TO RELEASE AND WAIVE ePREOP FROM ANY AND ALL CLAIMS OF WARRANTY, TORT OR CONTRACT LAW OF ANY KIND  Electronically signed by:Anjel Perez MD  Nov 23 2017 10:10PM EST

## 2018-01-23 NOTE — MISCELLANEOUS
Message   Recorded as Task   Date: 11/29/2017 11:41 AM, Created By: Kj Tamez   Task Name: Care Coordination   Assigned To: Kj Tamez   Regarding PatientAlycia Feliciano, Status: Active   CommentIsreal [facility] - 29 Nov 2017 11:41 AM     TASK CREATED  Patient was given specific instructions for stent removal on Friday  He will call after removal with an update   Kj Tamez - 01 Dec 2017 4:15 PM     TASK EDITED  Patient stated that he is doing well since stent removal   He notices some hematuria  Encouraged to increase water and call if hematuria does not resolve in several days        Active Problems    1  Nephrolithiasis (592 0) (N20 0)    Current Meds   1  Bystolic 10 MG Oral Tablet; Therapy: (Recorded:20Nov2017) to Recorded   2  Chlorthalidone 25 MG Oral Tablet; Therapy: (Recorded:20Nov2017) to Recorded   3  Lipitor 20 MG Oral Tablet (Atorvastatin Calcium); TAKE 1 TABLET DAILY; Therapy: [id number] to (Last Rx:[id number])  Requested for: [id number] Ordered   4  Lisinopril 20 MG Oral Tablet; Therapy: (Recorded:20Nov2017) to Recorded   5  MetFORMIN HCl - 500 MG Oral Tablet; Therapy: (Recorded:20Nov2017) to Recorded   6  NIFEdipine ER Osmotic Release 30 MG Oral Tablet Extended Release 24 Hour; Therapy: (Recorded:20Nov2017) to Recorded    Allergies    1  Penicillins    Signatures   Electronically signed by :  Felice Jameson, ; Dec  1 2017  4:16PM EST                       (Author)

## 2018-01-27 DIAGNOSIS — N20.0 CALCULUS OF KIDNEY: ICD-10-CM

## 2018-02-16 ENCOUNTER — HOSPITAL ENCOUNTER (OUTPATIENT)
Dept: RADIOLOGY | Facility: HOSPITAL | Age: 63
Discharge: HOME | End: 2018-02-16
Attending: UROLOGY
Payer: COMMERCIAL

## 2018-02-16 ENCOUNTER — TRANSCRIBE ORDERS (OUTPATIENT)
Dept: RADIOLOGY | Facility: HOSPITAL | Age: 63
End: 2018-02-16

## 2018-02-16 DIAGNOSIS — N20.0 CALCULUS OF KIDNEY: ICD-10-CM

## 2018-02-16 PROCEDURE — 74018 RADEX ABDOMEN 1 VIEW: CPT

## 2018-02-20 NOTE — PROGRESS NOTES
2/21/2018      Chief Complaint   Patient presents with    Nephrolithiasis       Assessment and Plan    63 y o  male managed by Dr Lavell Grubbs    1  Nephrolithiasis    KUB reveals no current stone burden and the patient has no lower urinary tract complaints  Discussed stone diet modification and provided him with written instructions on this as well  We will see him in 1 year with a KUB and ultrasound obtained prior  Should continue prostate cancer screening yearly until 81  Patient understands and agrees this treatment plan  All questions and concerns have been addressed answered  History of Present Illness  Alexandria Nunn is a 63 y o  male here for follow up evaluation of nephrolithiasis  He had a 6 mm mid right ureteral calculi and underwent ureteroscopy 11/28/2017  Denies any stone passage or gross hematuria since this time  Stone analysis revealed 97% calcium oxalate stone in 3% calcium phosphate  KUB obtained 2/16/17 shows no stones  PSA obtained by his PCP 1/8/18 was 0 9  LUT wise admits to a weaker stream but he feels as thought he is emptying well and denies all other LUTs  Review of Systems    Urinary Incontinence Screening    Flowsheet Row Most Recent Value   Urinary Incontinence   Urinary Incontinence? No   Incomplete emptying? No   Urinary frequency? No   Urinary urgency? No   Urinary hesitancy? No   Dysuria (painful difficult urination)? No   Nocturia (waking up to use the bathroom)? No   Straining (having to push to go)? No   Weak stream?  Yes   Intermittent stream?  No   Post void dribbling? No   Vaginal pressure? No   Vaginal dryness?   No          Past Medical History  Past Medical History:   Diagnosis Date    Diabetes mellitus ([address] )     Hyperlipidemia     Hypertension        Past Social History  Past Surgical History:   Procedure Laterality Date    CATARACT EXTRACTION      HERNIA REPAIR      KNEE SURGERY      MT CYSTO/URETERO W/LITHOTRIPSY &INDWELL STENT INSRT Right 11/28/2017    Procedure: CYSTOSCOPY URETEROSCOPY WITH LITHOTRIPSY HOLMIUM LASER, RETROGRADE PYELOGRAM AND INSERTION STENT URETERAL;  Surgeon: Noah Rodríguez MD;  Location: [facility];  Service: Urology     History   Smoking Status    Former Smoker   Smokeless Tobacco    Never Used       Past Family History  History reviewed  No pertinent family history  Past Social history  Social History     Social History    Marital status: /Civil Union     Spouse name: N/A    Number of children: N/A    Years of education: N/A     Occupational History    Not on file  Social History Main Topics    Smoking status: Former Smoker    Smokeless tobacco: Never Used    Alcohol use 1 2 oz/week     2 Glasses of wine per week    Drug use: No    Sexual activity: Not on file     Other Topics Concern    Not on file     Social History Narrative    No narrative on file       Current Medications  Current Outpatient Prescriptions   Medication Sig Dispense Refill    Aspirin 162 5 MG CP24 Take by mouth daily      atorvastatin (LIPITOR) 10 mg tablet Take 10 mg by mouth daily      chlorthalidone 25 mg tablet Take 25 mg by mouth daily      metFORMIN (GLUCOPHAGE) 500 mg tablet Take 500 mg by mouth 2 (two) times a day with meals      nebivolol (BYSTOLIC) 10 mg tablet Take 10 mg by mouth daily      NIFEdipine ER (ADALAT CC) 30 MG 24 hr tablet Take 30 mg by mouth daily      lisinopril (ZESTRIL) 20 mg tablet Take 20 mg by mouth daily      traMADol (ULTRAM) 50 mg tablet Take 50 mg by mouth every 6 (six) hours as needed for moderate pain       No current facility-administered medications for this visit          Allergies  Allergies   Allergen Reactions    Penicillins Anaphylaxis         The following portions of the patient's history were reviewed and updated as appropriate: allergies, current medications, past medical history, past social history, past surgical history and problem list       Vitals  Vitals:    02/21/18 0920 BP: 142/80   Pulse: (!) 51   Weight: 93 kg (205 lb)   Height: 5' 11" (1 803 m)         Physical Exam    Constitutional   General appearance: Patient is seated and in no acute distress, well appearing and well nourished  Head and Face   Head and face: Normal     Eyes   Conjunctiva and lids: No erythema, swelling or discharge  Ears, Nose, Mouth, and Throat   Hearing: Normal     Pulmonary   Respiratory effort: No increased work of breathing or signs of respiratory distress  Cardiovascular   Examination of extremities for edema and/or varicosities: Normal     Abdomen   Abdomen: Non-tender, no masses  Musculoskeletal   Gait and station: Normal  Skin   Skin and subcutaneous tissue: Warm, dry, and intact  No visible lesions or rashes  Psychiatric   Judgment and insight: Normal  Recent and remote memory:  Normal  Mood and affect: Normal        Results  No results found for this or any previous visit (from the past 1 hour(s)) ]  Lab Results   Component Value Date    PSA 0 9 01/08/2018     Lab Results   Component Value Date    GLUCOSE 85 11/07/2017    CALCIUM 8 9 01/08/2018     01/08/2018    K 3 5 01/08/2018    CO2 29 01/08/2018     01/08/2018    BUN 20 01/08/2018    CREATININE 0 96 01/08/2018     Lab Results   Component Value Date    WBC 7 13 01/08/2018    HGB 14 7 01/08/2018    HCT 43 1 01/08/2018    MCV 90 01/08/2018     01/08/2018     KUB  No calculi       Orders  No orders of the defined types were placed in this encounter

## 2018-02-21 ENCOUNTER — OFFICE VISIT (OUTPATIENT)
Dept: UROLOGY | Facility: AMBULATORY SURGERY CENTER | Age: 63
End: 2018-02-21
Payer: COMMERCIAL

## 2018-02-21 VITALS
HEIGHT: 71 IN | SYSTOLIC BLOOD PRESSURE: 142 MMHG | HEART RATE: 51 BPM | BODY MASS INDEX: 28.7 KG/M2 | DIASTOLIC BLOOD PRESSURE: 80 MMHG | WEIGHT: 205 LBS

## 2018-02-21 DIAGNOSIS — N20.0 NEPHROLITHIASIS: Primary | ICD-10-CM

## 2018-02-21 PROCEDURE — 99213 OFFICE O/P EST LOW 20 MIN: CPT | Performed by: PHYSICIAN ASSISTANT

## 2018-10-26 ENCOUNTER — ANESTHESIA EVENT (OUTPATIENT)
Dept: GASTROENTEROLOGY | Facility: HOSPITAL | Age: 63
End: 2018-10-26
Payer: COMMERCIAL

## 2018-10-29 ENCOUNTER — HOSPITAL ENCOUNTER (OUTPATIENT)
Facility: HOSPITAL | Age: 63
Setting detail: OUTPATIENT SURGERY
Discharge: HOME | End: 2018-10-29
Attending: COLON & RECTAL SURGERY | Admitting: COLON & RECTAL SURGERY
Payer: COMMERCIAL

## 2018-10-29 ENCOUNTER — ANESTHESIA (OUTPATIENT)
Dept: GASTROENTEROLOGY | Facility: HOSPITAL | Age: 63
End: 2018-10-29
Payer: COMMERCIAL

## 2018-10-29 VITALS
HEART RATE: 52 BPM | OXYGEN SATURATION: 99 % | BODY MASS INDEX: 28.7 KG/M2 | HEIGHT: 71 IN | RESPIRATION RATE: 13 BRPM | DIASTOLIC BLOOD PRESSURE: 56 MMHG | TEMPERATURE: 97.4 F | SYSTOLIC BLOOD PRESSURE: 111 MMHG | WEIGHT: 205 LBS

## 2018-10-29 DIAGNOSIS — Z12.11 ENCOUNTER FOR SCREENING FOR MALIGNANT NEOPLASM OF COLON: ICD-10-CM

## 2018-10-29 DIAGNOSIS — Z86.010 HISTORY OF COLONIC POLYPS: ICD-10-CM

## 2018-10-29 LAB — GLUCOSE SERPL-MCNC: 122 MG/DL (ref 65–140)

## 2018-10-29 PROCEDURE — 88305 TISSUE EXAM BY PATHOLOGIST: CPT | Performed by: PATHOLOGY

## 2018-10-29 PROCEDURE — 82948 REAGENT STRIP/BLOOD GLUCOSE: CPT

## 2018-10-29 RX ORDER — PROPOFOL 10 MG/ML
INJECTION, EMULSION INTRAVENOUS AS NEEDED
Status: DISCONTINUED | OUTPATIENT
Start: 2018-10-29 | End: 2018-10-29 | Stop reason: SURG

## 2018-10-29 RX ORDER — SODIUM CHLORIDE 9 MG/ML
125 INJECTION, SOLUTION INTRAVENOUS CONTINUOUS
Status: DISCONTINUED | OUTPATIENT
Start: 2018-10-29 | End: 2018-10-29 | Stop reason: HOSPADM

## 2018-10-29 RX ORDER — PROPOFOL 10 MG/ML
INJECTION, EMULSION INTRAVENOUS CONTINUOUS PRN
Status: DISCONTINUED | OUTPATIENT
Start: 2018-10-29 | End: 2018-10-29 | Stop reason: SURG

## 2018-10-29 RX ADMIN — PROPOFOL 80 MG: 10 INJECTION, EMULSION INTRAVENOUS at 07:30

## 2018-10-29 RX ADMIN — SODIUM CHLORIDE 125 ML/HR: 0.9 INJECTION, SOLUTION INTRAVENOUS at 07:22

## 2018-10-29 RX ADMIN — PROPOFOL 120 MCG/KG/MIN: 10 INJECTION, EMULSION INTRAVENOUS at 07:30

## 2018-10-29 NOTE — ANESTHESIA PREPROCEDURE EVALUATION
Review of Systems/Medical History  Patient summary reviewed  Chart reviewed      Cardiovascular  Exercise tolerance (METS): >4,  Hyperlipidemia, Hypertension ,    Pulmonary  Negative pulmonary ROS Sleep apnea ,        GI/Hepatic  Negative GI/hepatic ROS          Kidney stones,        Endo/Other  Diabetes type 2 Diet controlled,      GYN       Hematology  Negative hematology ROS      Musculoskeletal  Negative musculoskeletal ROS        Neurology  Negative neurology ROS      Psychology           Physical Exam    Airway    Mallampati score: II  TM Distance: <3 FB  Neck ROM: full     Dental       Cardiovascular  Rhythm: regular, Rate: normal,     Pulmonary  Breath sounds clear to auscultation,     Other Findings  caps      Anesthesia Plan  ASA Score- 2     Anesthesia Type- IV sedation with anesthesia with ASA Monitors  Additional Monitors:   Airway Plan:         Plan Factors- Patient instructed to abstain from smoking on day of procedure  Patient did not smoke on day of surgery  Induction- intravenous  Postoperative Plan-     Informed Consent- Anesthetic plan and risks discussed with patient

## 2018-10-29 NOTE — DISCHARGE SUMMARY
[facility]                                                                                 DISCHARGE SUMMARY        PATIENT NAME: Mehdi Cha    :  1955  MRN: [id number]  Pt Location: [facility]    DISCHARGE DATE: 10/29/2018    PROCEDURE: Procedure(s) (LRB):  COLONOSCOPY with polypectomy and hemostasis clipping  (N/A)    Anesthesia Type: IV Sedation with Anesthesia    Complications: None    Specimen(s):  ID Type Source Tests Collected by Time Destination   1 : Cecal polyp Tissue Colon TISSUE EXAM Rivera Palmer MD 10/29/2018 [address] [place] [address] COURSE: The patient was admitted for elective procedure  The procedure was uncomplicated and the the patient was discharged home post procedure          SIGNATURE: Rivera Palmer MD  DATE: 2018  TIME: 7:45 AM

## 2018-10-29 NOTE — ANESTHESIA POSTPROCEDURE EVALUATION
Post-Op Assessment Note      CV Status:  Stable    Mental Status:  Alert and awake    Hydration Status:  Euvolemic    PONV Controlled:  Controlled    Airway Patency:  Patent    Post Op Vitals Reviewed: Yes          Staff: Anesthesiologist           /56 (10/29/18 0759)    Temp     Pulse (!) 52 (10/29/18 0759)   Resp 13 (10/29/18 0759)    SpO2 99 % (10/29/18 0759)

## 2018-10-29 NOTE — OP NOTE
OPERATIVE REPORT  PATIENT NAME: Arta Schlatter    :  1955  MRN: [id number]  Pt Location: [facility]    SURGERY DATE: 10/29/2018    Indications:  Personal history of colon polyps  Procedure:  Colonoscopy to cecum  Polypectomy with hot snare  Application of Endo clip  Findings:  Cecal polyps  Anesthesia Type: IV Sedation with Anesthesia    Complications: None      Procedure: The patient was in the left lateral position  Sedation was administered by anesthesia  Rectal exam was unremarkable  The scope was introduced and passed without difficulty to the cecum  This was confirmed by the ileocecal valve and appendiceal orifice  In the cecum was a 1 cm polyp excised by hot snare  There appeared to be cauterized vessel after removal of the polyp and for this reason I applied an Endo clip around this  Next to this was a smaller polyp removed by cold snare  The scope was removed  The bowel preparation was excellent  There were no additional lesions seen  Impression:  Status post polypectomy and high risk patient with previous history of colon polyps  Plan:  Repeat colonoscopy in 3 years  Specimen(s):  ID Type Source Tests Collected by Time Destination   1 : Cecal polyp Tissue Colon TISSUE EXAM Gilma Henry MD 10/29/2018 [id number]          Attestation: I was present for the entire procedure        Patient Disposition: PACU      SIGNATURE: Gilma Henry MD  DATE: 2018  TIME: 7:42 AM

## 2018-10-29 NOTE — DISCHARGE INSTRUCTIONS
Colonoscopy   WHAT YOU NEED TO KNOW:   A colonoscopy is a procedure to examine the inside of your colon (intestine) with a scope  Polyps or tissue growths may have been removed during your colonoscopy  It is normal to feel bloated and to have some abdominal discomfort  You should be passing gas  If you have hemorrhoids or you had polyps removed, you may have a small amount of bleeding  DISCHARGE INSTRUCTIONS:   Seek care immediately if:   · You have a large amount of bright red blood in your bowel movements  · Your abdomen is hard and firm and you have severe pain  · You have sudden trouble breathing  Contact your healthcare provider if:   · You develop a rash or hives  · You have a fever within 24 hours of your procedure  · You have not had a bowel movement for 3 days after your procedure  · You have questions or concerns about your condition or care  Activity:   · Do not lift, strain, or run  for 3 days after your procedure  · Rest after your procedure  You have been given medicine to relax you  Do not  drive or make important decisions until the day after your procedure  Return to your normal activity as directed  · Relieve gas and discomfort from bloating  by lying on your right side with a heating pad on your abdomen  You may need to take short walks to help the gas move out  Eat small meals until bloating is relieved  If you had polyps removed: For 7 days after your procedure:  · Do not  take aspirin  · Do not  go on long car rides  Help prevent constipation:   · Eat a variety of healthy foods  Healthy foods include fruit, vegetables, whole-grain breads, low-fat dairy products, beans, lean meat, and fish  Ask if you need to be on a special diet  Your healthcare provider may recommend that you eat high-fiber foods such as cooked beans  Fiber helps you have regular bowel movements  · Drink liquids as directed    Adults should drink between 9 and 13 eight-ounce cups of liquid every day  Ask what amount is best for you  For most people, good liquids to drink are water, juice, and milk  · Exercise as directed  Talk to your healthcare provider about the best exercise plan for you  Exercise can help prevent constipation, decrease your blood pressure and improve your health  Follow up with your healthcare provider as directed:  Write down your questions so you remember to ask them during your visits  © 2017 [address] Information is for End User's use only and may not be sold, redistributed or otherwise used for commercial purposes  All illustrations and images included in CareNotes® are the copyrighted property of A D A M , Inc  or Malachi Meyer  The above information is an  only  It is not intended as medical advice for individual conditions or treatments  Talk to your doctor, nurse or pharmacist before following any medical regimen to see if it is safe and effective for you  [facility]  [facility] [address] [facility], [address] [place] [address]  Phone: [phone]    DISCHARGE INSTRUCTIONS:    1   ___ Complete Exam - Normal    2   ___ Exam normal, but entire colon not seen  We will discuss this with you  3   ___ Polyp(s) removed by "burning" - NO pathology report will follow    4   _2__ Polyp(s) removed by excision  Pathology report will be available in 4-5 days   Someone from our office will call you with results  5   ___ Exam prompted biopsies  Pathology report will be available in 4-5 days   Someone from our office will call you with results  6   ___ Exam demonstrated findings that need treatment  Prescriptions will be   Given to you  Return to our office in ____ weeks  Please call for appt  7   ___ Original office visit or colonoscopy findings necessitate an office visit     Please call to set up a new appointment    8   ___ Medication __________________________________________        [address]:    - Go straight home and rest today    - No driving or drinking alcohol for 24 hours    - Resume regular diet and medications unless otherwise instructed  Coumadin and Plavix are blood thinners  You can resume these medications on __________  IF YOU ARE HAVING ANY FEVER, BLEEDING OR PERSISTENT PAIN IN THE ABDOMEN, PLEASE CALL OUR OFFICE ANY DAY OR TIME  [phone]        Colonoscopy   AMBULATORY CARE:   What you need to know about a colonoscopy:  A colonoscopy is a procedure to examine the inside of your colon (intestine) with a scope  A scope is a flexible tube with a small light and camera on the end  Polyps or tissue growths may be removed during your colonoscopy  What you need to do the week before your colonoscopy: You will need to stop taking medicines that contain aspirin or iron for 7 days before your colonoscopy  If you take anticoagulants, such as warfarin, ask when you should stop taking it  Make plans for someone to drive you home after your procedure  How to prepare for your colonoscopy: Your healthcare provider will have you prepare your bowels before your procedure  Your bowels will need to be empty before your procedure to allow him to clearly see your colon  You will need to do the following the day before your procedure:  · Have only clear liquids  for the entire day before your colonoscopy  Clear liquid diet includes clear fruit juices and broths, clear flavored gelatin, and hard candy  It also includes coffee, tea, carbonated beverages, and clear sports drinks  · Follow your bowel prep as directed  There are many different preparations that can be given before a colonoscopy  Some are given over 2 hours and others over 6 hours  Some are given earlier in the afternoon the day before the colonoscopy  Others are given the day before and then the morning of the colonoscopy   With any bowel prep, stay close to the bathroom  This liquid will cause your bowels to move frequently  · An enema  may be needed  Your healthcare provider may tell you to use an enema to help clean out your bowels  · Do not eat or drink anything after midnight  This will help prevent problems that can happen if you vomit while under anesthesia  What will happen during your colonoscopy:   · You will be given medicine to help you relax  You will lie on your left side and raise one or both knees toward your chest  Your healthcare provider will examine your anus and use a finger to check your rectum  You may need another enema if your bowel is not empty  The scope will be lubricated and gently placed into your anus  It will then be passed through your rectum and into your colon  Water or air will be put into your colon to help clean or expand it  This is done so your healthcare provider can see your colon clearly  · Tissue samples may be taken from the walls of your bowel and sent to a lab for tests  If you have a polyp, your healthcare provider will pass a wire loop through the scope and use it to hold the polyp  The polyp is then burned or cut off the wall of your colon  Removed polyps are sent to a lab for tests  Pictures of your colon may be taken during the procedure  The scope will be removed when the procedure is done  What will happen after your colonoscopy:   · Rest after your procedure  You may feel bloated, have some gas and abdominal discomfort  You may need to lie on your right side with a heating pad on your abdomen  You may need to take short walks to help move the gas out  Eat small meals, if you feel bloated  Do not drive or make important decisions until the day after your procedure  · You may have polyps removed  Do not take aspirin or go on long car trips for 7 days after your procedure  Ask your healthcare provider about any other limits after your procedure  Risks of a colonoscopy:   You may have pain or bleeding after the scope or polyps are removed  You may also have a slow heartbeat, decreased blood pressure, or increased sweating  Your colon may tear due to the increased pressure from the scope and other instruments  This may cause bowel contents to leak out of your colon and into your abdomen  If this happens, you will need to stay in the hospital and have surgery on your colon  Seek care immediately if:   · You have a large amount of bright red blood in your bowel movements  · Your abdomen is hard and firm and you have severe pain  · You have sudden trouble breathing  Contact your healthcare provider if:   · You develop a rash or hives  · You have a fever within 24 hours of your procedure  · You have not had a bowel movement for 3 days after your procedure  · You have questions or concerns about your condition or care  Activity:   · Do not lift, strain, or run  for 3 days after your procedure  · Rest after your procedure  You have been given medicine to relax you  Do not  drive or make important decisions until the day after your procedure  Return to your normal activity as directed  · Relieve gas and discomfort from bloating  by lying on your right side with a heating pad on your abdomen  You may need to take short walks to help the gas move out  Eat small meals until bloating is relieved  If you had polyps removed: For 7 days after your procedure:  · Do not  take aspirin  · Do not  go on long car rides  Help prevent constipation:   · Eat a variety of healthy foods  Healthy foods include fruit, vegetables, whole-grain breads, low-fat dairy products, beans, lean meat, and fish  Ask if you need to be on a special diet  Your healthcare provider may recommend that you eat high-fiber foods such as cooked beans  Fiber helps you have regular bowel movements  · Drink liquids as directed  Adults should drink between 9 and 13 eight-ounce cups of liquid every day   Ask what amount is best for you  For most people, good liquids to drink are water, juice, and milk  · Exercise as directed  Talk to your healthcare provider about the best exercise plan for you  Exercise can help prevent constipation, decrease your blood pressure and improve your health  Follow up with your healthcare provider as directed:  Write down your questions so you remember to ask them during your visits  © 2017 [facility] Information is for End User's use only and may not be sold, redistributed or otherwise used for commercial purposes  All illustrations and images included in CareNotes® are the copyrighted property of A D A M , Inc  or Malachi Meyer  The above information is an  only  It is not intended as medical advice for individual conditions or treatments  Talk to your doctor, nurse or pharmacist before following any medical regimen to see if it is safe and effective for you  Colorectal Polyps   WHAT YOU NEED TO KNOW:   Colorectal polyps are small growths of tissue in the lining of the colon and rectum  Most polyps are hyperplastic polyps and are usually benign (noncancerous)  Certain types of polyps, called adenomatous polyps, may turn into cancer  DISCHARGE INSTRUCTIONS:   Follow up with your healthcare provider or gastroenterologist as directed: You may need to return for more tests, such as another colonoscopy  Write down your questions so you remember to ask them during your visits  Reduce your risk for colorectal polyps:   · Eat a variety of healthy foods:  Healthy foods include fruit, vegetables, whole-grain breads, low-fat dairy products, beans, lean meat, and fish  Ask if you need to be on a special diet  · Maintain a healthy weight:  Ask your healthcare provider if you need to lose weight and how much you need to lose  Ask for help with a weight loss program     · Exercise:  Begin to exercise slowly and do more as you get stronger   Talk with your healthcare provider before you start an exercise program      · Limit alcohol:  Your risk for polyps increases the more you drink  · Do not smoke: If you smoke, it is never too late to quit  Ask for information about how to stop  For support and more information:   · Ed Lyons ([facility])  [address] , [place] [address]  Phone: 4- 015 - [phone]  Web Address: www digestive  [facility] gov  Contact your healthcare provider or gastroenterologist if:   · You have a fever  · You have chills, a cough, or feel weak and achy  · You have abdominal pain that does not go away or gets worse after you take medicine  · Your abdomen is swollen  · You are losing weight without trying  · You have questions or concerns about your condition or care  Seek care immediately or call 911 if:   · You have sudden shortness of breath  · You have a fast heart rate, fast breathing, or are too dizzy to stand up  · You have severe abdominal pain  · You see blood in your bowel movement  © 2017 [facility] [facility] Information is for End User's use only and may not be sold, redistributed or otherwise used for commercial purposes  All illustrations and images included in CareNotes® are the copyrighted property of A [employer] A M , Inc  or Malachi Meyer  The above information is an  only  It is not intended as medical advice for individual conditions or treatments  Talk to your doctor, nurse or pharmacist before following any medical regimen to see if it is safe and effective for you

## 2019-02-11 ENCOUNTER — TRANSCRIBE ORDERS (OUTPATIENT)
Dept: RADIOLOGY | Facility: HOSPITAL | Age: 64
End: 2019-02-11

## 2019-02-11 ENCOUNTER — HOSPITAL ENCOUNTER (OUTPATIENT)
Dept: RADIOLOGY | Facility: HOSPITAL | Age: 64
Discharge: HOME | End: 2019-02-11
Payer: COMMERCIAL

## 2019-02-11 DIAGNOSIS — N20.0 NEPHROLITHIASIS: ICD-10-CM

## 2019-02-11 PROCEDURE — 74018 RADEX ABDOMEN 1 VIEW: CPT

## 2019-02-11 PROCEDURE — 76770 US EXAM ABDO BACK WALL COMP: CPT

## 2019-02-14 ENCOUNTER — TELEPHONE (OUTPATIENT)
Dept: UROLOGY | Facility: AMBULATORY SURGERY CENTER | Age: 64
End: 2019-02-14

## 2019-02-14 NOTE — TELEPHONE ENCOUNTER
Please check if referral is needed and contact patient and/or patient's PCP for referral if needed  Thank you

## 2019-02-14 NOTE — TELEPHONE ENCOUNTER
Patient requires a referral for his appointment on 2/18/19 with Dr De Leon at the [address] [address] location    Thanks  Bernard Raymundo

## 2019-02-14 NOTE — TELEPHONE ENCOUNTER
I called PCP Dr Stacie Graham office and left a detailed message on the referral line  I also called patient and made aware that referral was needed

## 2019-02-15 NOTE — TELEPHONE ENCOUNTER
Referral has been entered  I printed copy and scanned into patients chart as well as entered referral into Epic and linked to appointment

## 2019-02-15 NOTE — TELEPHONE ENCOUNTER
Spoke with a  at this PCP's office and stated the need for insurance referral on Monday as I did not see it put into navinet yet    stated that she will pass this along to the referral specialist

## 2019-02-18 ENCOUNTER — OFFICE VISIT (OUTPATIENT)
Dept: UROLOGY | Facility: CLINIC | Age: 64
End: 2019-02-18
Payer: COMMERCIAL

## 2019-02-18 VITALS
DIASTOLIC BLOOD PRESSURE: 90 MMHG | SYSTOLIC BLOOD PRESSURE: 124 MMHG | HEART RATE: 99 BPM | BODY MASS INDEX: 28.56 KG/M2 | WEIGHT: 204 LBS | HEIGHT: 71 IN

## 2019-02-18 DIAGNOSIS — N20.0 NEPHROLITHIASIS: Primary | ICD-10-CM

## 2019-02-18 DIAGNOSIS — Z12.5 SCREENING FOR PROSTATE CANCER: ICD-10-CM

## 2019-02-18 PROCEDURE — 99213 OFFICE O/P EST LOW 20 MIN: CPT | Performed by: UROLOGY

## 2019-02-18 RX ORDER — FLUTICASONE PROPIONATE 50 MCG
SPRAY, SUSPENSION (ML) NASAL
COMMUNITY

## 2019-02-18 RX ORDER — LATANOPROST 50 UG/ML
SOLUTION/ DROPS OPHTHALMIC
Refills: 5 | COMMUNITY
Start: 2019-01-24

## 2019-02-18 NOTE — PROGRESS NOTES
2/18/2019    Pavithra Hartman  1955  [id number]        Assessment  Routine prostate cancer screening, history of nephrolithiasis, mild erectile dysfunction      Discussion  We reviewed both his KUB and ultrasound which show no sign of stones  I stressed the importance of hydration  We discussed his normal digital rectal examination  His next PSA level will be due in early 2020  We discussed PDE 5 inhibitors but the patient wishes to hold at this time  He will call sooner if he wishes to readdress a prescription for generic sildenafil  He was also instructed to call sooner if he were to develop recurrent flank pain  History of Present Illness  66 y o  male with a history of nephrolithiasis  In November 2017 he underwent right ureteroscopy for 6 millimeter mid ureteral calculus  Analysis revealed a 97 percent calcium oxalate stone  He has not had a recurrence since  He returns in follow-up today  He is completely asymptomatic and pain-free  KUB and retroperitoneal ultrasound show no evidence of nephrolithiasis  His most recent PSA from January 2018 is 0 9  He denies family history of prostate cancer  He denies any lower urinary tract symptoms  He has very mild erectile dysfunction but is not interested in PDE 5 inhibitors at this time  AUA Symptom Score  AUA SYMPTOM SCORE      Most Recent Value   AUA SYMPTOM SCORE   How often have you had a sensation of not emptying your bladder completely after you finished urinating? 0   How often have you had to urinate again less than two hours after you finished urinating? 0   How often have you found you stopped and started again several times when you urinate?  0   How often have you found it difficult to postpone urination? 1   How often have you had a weak urinary stream?  0   How often have you had to push or strain to begin urination?   0   How many times did you most typically get up to urinate from the time you went to bed at night until the time you got up in the morning? 2   Quality of Life: If you were to spend the rest of your life with your urinary condition just the way it is now, how would you feel about that?  1   AUA SYMPTOM SCORE  3          Review of Systems  Review of Systems   Constitutional: Negative  HENT: Negative  Eyes: Negative  Respiratory: Negative  Cardiovascular: Negative  Gastrointestinal: Negative  Endocrine: Negative  Genitourinary: Negative  Musculoskeletal: Negative  Skin: Negative  Allergic/Immunologic: Negative  Neurological: Negative  Hematological: Negative  Psychiatric/Behavioral: Negative  Past Medical History  Past Medical History:   Diagnosis Date    Diabetes mellitus ([address] )     Type 2    History of colon polyps     Hyperlipidemia     Hypertension     Prediabetes     Sleep apnea     Vitamin D deficiency        Past Social History  Past Surgical History:   Procedure Laterality Date    ARTHROSCOPIC REPAIR ACL      CATARACT EXTRACTION      COLONOSCOPY      COLONOSCOPY N/A 10/29/2018    Procedure: COLONOSCOPY with polypectomy and hemostasis clipping ;  Surgeon: Phillip Lopez MD;  Location: [facility];   Service: Colorectal    HERNIA REPAIR      KNEE ARTHROSCOPY Left     acl repair    KNEE SURGERY      DE CYSTO/URETERO W/LITHOTRIPSY &INDWELL STENT INSRT Right 11/28/2017    Procedure: CYSTOSCOPY URETEROSCOPY WITH LITHOTRIPSY HOLMIUM LASER, RETROGRADE PYELOGRAM AND INSERTION STENT URETERAL;  Surgeon: Chucky Betancourt MD;  Location: [facility];  Service: Urology       Past Family History  Family History   Problem Relation Age of Onset    Heart attack Father     Cancer Mother         lung    Diabetes Mother        Past Social history  Social History     Socioeconomic History    Marital status: /Civil Union     Spouse name: Not on file    Number of children: Not on file    Years of education: Not on file    Highest education level: Not on file Occupational History    Not on file   Social Needs    Financial resource strain: Not on file    Food insecurity:     Worry: Not on file     Inability: Not on file    Transportation needs:     Medical: Not on file     Non-medical: Not on file   Tobacco Use    Smoking status: Former Smoker    Smokeless tobacco: Never Used   Substance and Sexual Activity    Alcohol use:  Yes     Alcohol/week: 1 2 oz     Types: 2 Glasses of wine per week    Drug use: No    Sexual activity: Not on file   Lifestyle    Physical activity:     Days per week: Not on file     Minutes per session: Not on file    Stress: Not on file   Relationships    Social connections:     Talks on phone: Not on file     Gets together: Not on file     Attends Hoahaoism service: Not on file     Active member of club or organization: Not on file     Attends meetings of clubs or organizations: Not on file     Relationship status: Not on file    Intimate partner violence:     Fear of current or ex partner: Not on file     Emotionally abused: Not on file     Physically abused: Not on file     Forced sexual activity: Not on file   Other Topics Concern    Not on file   Social History Narrative    Pt has not been out of the country in the last 3 weeks nor has he been exposed to any one who has       Current Medications  Current Outpatient Medications   Medication Sig Dispense Refill    Aspirin 162 5 MG CP24 Take 81 mg by mouth daily        atorvastatin (LIPITOR) 10 mg tablet Take 10 mg by mouth daily      chlorthalidone 25 mg tablet Take 25 mg by mouth daily      fluticasone (FLONASE) 50 mcg/act nasal spray as needed      latanoprost (XALATAN) 0 005 % ophthalmic solution INSTILL 1 DROP BY OPHTHALMIC ROUTE INTO INTO BOTH EYES AT BEDTIME  5    metFORMIN (GLUCOPHAGE) 500 mg tablet Take 500 mg by mouth 2 (two) times a day with meals      NIFEdipine ER (ADALAT CC) 30 MG 24 hr tablet Take 30 mg by mouth daily      nebivolol (BYSTOLIC) 10 mg tablet Take 10 mg by mouth daily       No current facility-administered medications for this visit  Allergies  Allergies   Allergen Reactions    Penicillins Anaphylaxis       Past Medical History, Social History, Family History, medications and allergies were reviewed  Vitals  Vitals:    02/18/19 1425   BP: 124/90   Pulse: 99   Weight: 92 5 kg (204 lb)   Height: 5' 11" (1 803 m)       Physical Exam  Physical Exam  On examination he is in no acute distress  His abdomen is soft nontender nondistended  A well-healed right inguinal hernia scars appreciated without recurrence  Phallus, scrotum and scrotal contents are normal   Digital rectal examination reveals a 35 gram prostate which is smooth and firm without nodularity  Skin is warm  Extremities without edema    Neurologic is grossly intact and nonfocal   Gait normal   Affect normal    Results  Lab Results   Component Value Date    PSA 0 9 01/08/2018     Lab Results   Component Value Date    CALCIUM 8 9 01/08/2018    K 3 5 01/08/2018    CO2 29 01/08/2018     01/08/2018    BUN 20 01/08/2018    CREATININE 0 96 01/08/2018     Lab Results   Component Value Date    WBC 7 13 01/08/2018    HGB 14 7 01/08/2018    HCT 43 1 01/08/2018    MCV 90 01/08/2018     01/08/2018         Office Urine Dip  No results found for this or any previous visit (from the past 1 hour(s)) ]

## 2020-02-17 ENCOUNTER — TELEPHONE (OUTPATIENT)
Dept: UROLOGY | Facility: AMBULATORY SURGERY CENTER | Age: 65
End: 2020-02-17

## 2020-02-17 NOTE — TELEPHONE ENCOUNTER
Detailed message left on referral line at [address] [facility] at [facility] Dr Bartolome Garcia [phone]

## 2020-02-17 NOTE — TELEPHONE ENCOUNTER
Patient is going to be seen at [address] [place] [address] on 3/2, and is in need of a insurance referral for the visit  The diagnosis codes needed for the visit are N20 0, and Z12 5  The procedure code for the visit will be [id number]  Thanks for your time and have a great day

## 2020-02-26 DIAGNOSIS — N20.0 NEPHROLITHIASIS: Primary | ICD-10-CM

## 2020-02-26 DIAGNOSIS — Z12.5 SCREENING FOR PROSTATE CANCER: ICD-10-CM

## 2020-02-27 ENCOUNTER — TRANSCRIBE ORDERS (OUTPATIENT)
Dept: RADIOLOGY | Facility: HOSPITAL | Age: 65
End: 2020-02-27

## 2020-02-27 ENCOUNTER — HOSPITAL ENCOUNTER (OUTPATIENT)
Dept: RADIOLOGY | Facility: HOSPITAL | Age: 65
Discharge: HOME | End: 2020-02-27
Attending: UROLOGY
Payer: COMMERCIAL

## 2020-02-27 ENCOUNTER — APPOINTMENT (OUTPATIENT)
Dept: LAB | Facility: HOSPITAL | Age: 65
End: 2020-02-27
Attending: UROLOGY
Payer: COMMERCIAL

## 2020-02-27 DIAGNOSIS — N20.0 NEPHROLITHIASIS: ICD-10-CM

## 2020-02-27 DIAGNOSIS — Z12.5 SCREENING FOR PROSTATE CANCER: ICD-10-CM

## 2020-02-27 LAB
PSA SERPL-MCNC: 1.2 NG/ML (ref 0–4)
PSA SERPL-MCNC: 1.2 NG/ML (ref 0–4)

## 2020-02-27 PROCEDURE — 74018 RADEX ABDOMEN 1 VIEW: CPT

## 2020-02-27 PROCEDURE — G0103 PSA SCREENING: HCPCS

## 2020-02-27 PROCEDURE — 36415 COLL VENOUS BLD VENIPUNCTURE: CPT

## 2020-03-02 ENCOUNTER — OFFICE VISIT (OUTPATIENT)
Dept: UROLOGY | Facility: CLINIC | Age: 65
End: 2020-03-02
Payer: COMMERCIAL

## 2020-03-02 VITALS
BODY MASS INDEX: 26.88 KG/M2 | SYSTOLIC BLOOD PRESSURE: 124 MMHG | DIASTOLIC BLOOD PRESSURE: 88 MMHG | WEIGHT: 192 LBS | HEIGHT: 71 IN | HEART RATE: 98 BPM

## 2020-03-02 DIAGNOSIS — Z12.5 SCREENING FOR PROSTATE CANCER: Primary | ICD-10-CM

## 2020-03-02 DIAGNOSIS — N20.0 CALCULUS OF KIDNEY: ICD-10-CM

## 2020-03-02 PROCEDURE — 99214 OFFICE O/P EST MOD 30 MIN: CPT | Performed by: UROLOGY

## 2020-03-02 RX ORDER — PREDNISOLONE ACETATE 10 MG/ML
1 SUSPENSION/ DROPS OPHTHALMIC
COMMUNITY
Start: 2019-11-30

## 2020-03-02 NOTE — PROGRESS NOTES
3/2/2020    Cristin Eisenberg  1955  [id number]        Assessment  History nephrolithiasis status post ureteroscopy, routine prostate cancer screening, mild erectile dysfunction      Discussion  Discussed his recent KUB which shows no signs of residual calculi  We discussed the importance of hydration  We reviewed his PSA and normal digital rectal examination  He is not interested in PDE 5 inhibitors at this time  I recommend follow-up in 1 year with a repeat PSA, digital rectal examination, and KUB  We discussed that if these parameters remained stable we may switch to every other year KUB and PSA testing  The patient is amenable with this plan  History of Present Illness  60 y o  male with a history of a 6 mm right proximal ureteral calculus requiring right ureteroscopy with holmium laser lithotripsy, right retrograde pyelography and right ureteral stent insertion in 2017  Fortunately he has not had any stones since  He recently had mild right testicular discomfort which has since resolved  He was concerned that he may have been passing a stone, however, he denies any hematuria or visualization of stone passage  A recent KUB was performed and shows no evidence of stones  A recent PSA is 1 2  He denies any family history of prostate cancer  He has only mild erectile dysfunction but is not interested in PDE 5 inhibitors at this time  He denies any significant lower urinary tract symptoms  He voids with an adequate urinary stream and empties to completion  AUA Symptom Score      Review of Systems  Review of Systems   Constitutional: Negative  HENT: Negative  Eyes: Negative  Respiratory: Negative  Cardiovascular: Negative  Gastrointestinal: Negative  Endocrine: Negative  Genitourinary:        Per HPI   Musculoskeletal: Negative  Skin: Negative  Allergic/Immunologic: Negative  Neurological: Negative  Hematological: Negative      Psychiatric/Behavioral: Negative  Past Medical History  Past Medical History:   Diagnosis Date    Diabetes mellitus ([facility] [address] )     Type 2    History of colon polyps     Hyperlipidemia     Hypertension     Prediabetes     Sleep apnea     Vitamin D deficiency        Past Social History  Past Surgical History:   Procedure Laterality Date    ARTHROSCOPIC REPAIR ACL      CATARACT EXTRACTION      COLONOSCOPY      COLONOSCOPY N/A 10/29/2018    Procedure: COLONOSCOPY with polypectomy and hemostasis clipping ;  Surgeon: Cindy Pope MD;  Location: [facility]; Service: Colorectal    HERNIA REPAIR      KNEE ARTHROSCOPY Left     acl repair    KNEE SURGERY      ND CYSTO/URETERO W/LITHOTRIPSY &INDWELL STENT INSRT Right 11/28/2017    Procedure: CYSTOSCOPY URETEROSCOPY WITH LITHOTRIPSY HOLMIUM LASER, RETROGRADE PYELOGRAM AND INSERTION STENT URETERAL;  Surgeon: Ernie Brock MD;  [facility];  Service: Urology       Past Family History  Family History   Problem Relation Age of Onset    Heart attack Father     Cancer Mother         lung    Diabetes Mother        Past Social history  Social History     Socioeconomic History    Marital status: /Civil Union     Spouse name: Not on file    Number of children: Not on file    Years of education: Not on file    Highest education level: Not on file   Occupational History    Not on file   Social Needs    Financial resource strain: Not on file    Food insecurity:     Worry: Not on file     Inability: Not on file    Transportation needs:     Medical: Not on file     Non-medical: Not on file   Tobacco Use    Smoking status: Former Smoker    Smokeless tobacco: Never Used   Substance and Sexual Activity    Alcohol use:  Yes     Alcohol/week: 2 0 standard drinks     Types: 2 Glasses of wine per week    Drug use: No    Sexual activity: Not on file   Lifestyle    Physical activity:     Days per week: Not on file     Minutes per session: Not on file    Stress: Not on file Relationships    Social connections:     Talks on phone: Not on file     Gets together: Not on file     Attends Congregational service: Not on file     Active member of club or organization: Not on file     Attends meetings of clubs or organizations: Not on file     Relationship status: Not on file    Intimate partner violence:     Fear of current or ex partner: Not on file     Emotionally abused: Not on file     Physically abused: Not on file     Forced sexual activity: Not on file   Other Topics Concern    Not on file   Social History Narrative    Pt has not been out of the country in the last 3 weeks nor has he been exposed to any one who has       Current Medications  Current Outpatient Medications   Medication Sig Dispense Refill    Aspirin 162 5 MG CP24 Take 81 mg by mouth daily        atorvastatin (LIPITOR) 10 mg tablet Take 10 mg by mouth daily      chlorthalidone 25 mg tablet Take 25 mg by mouth daily      metFORMIN (GLUCOPHAGE) 500 mg tablet Take 500 mg by mouth 2 (two) times a day with meals      NIFEdipine ER (ADALAT CC) 30 MG 24 hr tablet Take 30 mg by mouth daily      prednisoLONE acetate (PRED FORTE) 1 % ophthalmic suspension Apply 1 drop to eye      fluticasone (FLONASE) 50 mcg/act nasal spray as needed      latanoprost (XALATAN) 0 005 % ophthalmic solution INSTILL 1 DROP BY OPHTHALMIC ROUTE INTO INTO BOTH EYES AT BEDTIME  5    nebivolol (BYSTOLIC) 10 mg tablet Take 10 mg by mouth daily       No current facility-administered medications for this visit  Allergies  Allergies   Allergen Reactions    Penicillins Anaphylaxis       Past Medical History, Social History, Family History, medications and allergies were reviewed  Vitals  Vitals:    03/02/20 0811   BP: 124/88   BP Location: Left arm   Patient Position: Sitting   Cuff Size: Adult   Pulse: 98   Weight: 87 1 kg (192 lb)   Height: 5' 11" (1 803 m)       Physical Exam  Physical Exam  On examination he is in no acute distress    His abdomen is soft nontender nondistended   examination reveals normal phallus, scrotum and scrotal contents  A right inguinal hernia scars appreciated  There are no recurrent inguinal hernias noted  Digital rectal examination reveals a 35-40 g prostate which is smooth and firm with mild asymmetry with the left side slightly larger than the right but no nodularity or abnormality otherwise  Skin is warm  Extremities without edema    Neurologic is grossly intact and nonfocal   Gait normal   Affect normal      Results  Lab Results   Component Value Date    PSA 1 2 02/27/2020    PSA 1 2 02/27/2020    PSA 0 9 01/08/2018     Lab Results   Component Value Date    CALCIUM 8 9 01/08/2018    K 3 5 01/08/2018    CO2 29 01/08/2018     01/08/2018    BUN 20 01/08/2018    CREATININE 0 96 01/08/2018     Lab Results   Component Value Date    WBC 7 13 01/08/2018    HGB 14 7 01/08/2018    HCT 43 1 01/08/2018    MCV 90 01/08/2018     01/08/2018         Office Urine Dip  No results found for this or any previous visit (from the past 1 hour(s)) ]

## 2020-03-02 NOTE — LETTER
March 2, 2020     John Chester MD  [id number]    Patient: Luis Quintero   YOB: 1955   Date of Visit: 3/2/2020       Dear Dr Tamar Sanders: Thank you for referring Luis Quintero to me for evaluation  Below are my notes for this consultation  If you have questions, please do not hesitate to call me  I look forward to following your patient along with you  Sincerely,        Rebeca Forbes MD        CC: No Recipients  Rebeca Forbes MD  3/2/2020  8:47 AM  Sign at close encounter  3/2/2020    Luis Quintero  1955  [id number]        Assessment  History nephrolithiasis status post ureteroscopy, routine prostate cancer screening, mild erectile dysfunction      Discussion  Discussed his recent KUB which shows no signs of residual calculi  We discussed the importance of hydration  We reviewed his PSA and normal digital rectal examination  He is not interested in PDE 5 inhibitors at this time  I recommend follow-up in 1 year with a repeat PSA, digital rectal examination, and KUB  We discussed that if these parameters remained stable we may switch to every other year KUB and PSA testing  The patient is amenable with this plan  History of Present Illness  60 y o  male with a history of a 6 mm right proximal ureteral calculus requiring right ureteroscopy with holmium laser lithotripsy, right retrograde pyelography and right ureteral stent insertion in 2017  Fortunately he has not had any stones since  He recently had mild right testicular discomfort which has since resolved  He was concerned that he may have been passing a stone, however, he denies any hematuria or visualization of stone passage  A recent KUB was performed and shows no evidence of stones  A recent PSA is 1 2  He denies any family history of prostate cancer  He has only mild erectile dysfunction but is not interested in PDE 5 inhibitors at this time    He denies any significant lower urinary tract symptoms  He voids with an adequate urinary stream and empties to completion  AUA Symptom Score      Review of Systems  Review of Systems   Constitutional: Negative  HENT: Negative  Eyes: Negative  Respiratory: Negative  Cardiovascular: Negative  Gastrointestinal: Negative  Endocrine: Negative  Genitourinary:        Per HPI   Musculoskeletal: Negative  Skin: Negative  Allergic/Immunologic: Negative  Neurological: Negative  Hematological: Negative  Psychiatric/Behavioral: Negative  Past Medical History  Past Medical History:   Diagnosis Date    Diabetes mellitus ([address] )     Type 2    History of colon polyps     Hyperlipidemia     Hypertension     Prediabetes     Sleep apnea     Vitamin D deficiency        Past Social History  Past Surgical History:   Procedure Laterality Date    ARTHROSCOPIC REPAIR ACL      CATARACT EXTRACTION      COLONOSCOPY      COLONOSCOPY N/A 10/29/2018    Procedure: COLONOSCOPY with polypectomy and hemostasis clipping ;  Surgeon: Cici Billingsley MD;  Location: [facility];   Service: Colorectal    HERNIA REPAIR      KNEE ARTHROSCOPY Left     acl repair    KNEE SURGERY      NM CYSTO/URETERO W/LITHOTRIPSY &INDWELL STENT INSRT Right 11/28/2017    Procedure: CYSTOSCOPY URETEROSCOPY WITH LITHOTRIPSY HOLMIUM LASER, RETROGRADE PYELOGRAM AND INSERTION STENT URETERAL;  Surgeon: Magali Garcia MD;  [facility];  Service: Urology       Past Family History  Family History   Problem Relation Age of Onset    Heart attack Father     Cancer Mother         lung    Diabetes Mother        Past Social history  Social History     Socioeconomic History    Marital status: /Civil Union     Spouse name: Not on file    Number of children: Not on file    Years of education: Not on file    Highest education level: Not on file   Occupational History    Not on file   Social Needs    Financial resource strain: Not on file    Food insecurity:     Worry: Not on file     Inability: Not on file    Transportation needs:     Medical: Not on file     Non-medical: Not on file   Tobacco Use    Smoking status: Former Smoker    Smokeless tobacco: Never Used   Substance and Sexual Activity    Alcohol use:  Yes     Alcohol/week: 2 0 standard drinks     Types: 2 Glasses of wine per week    Drug use: No    Sexual activity: Not on file   Lifestyle    Physical activity:     Days per week: Not on file     Minutes per session: Not on file    Stress: Not on file   Relationships    Social connections:     Talks on phone: Not on file     Gets together: Not on file     Attends [employer] service: Not on file     Active member of club or organization: Not on file     Attends meetings of clubs or organizations: Not on file     Relationship status: Not on file    Intimate partner violence:     Fear of current or ex partner: Not on file     Emotionally abused: Not on file     Physically abused: Not on file     Forced sexual activity: Not on file   Other Topics Concern    Not on file   Social History Narrative    Pt has not been out of the country in the last 3 weeks nor has he been exposed to any one who has       Current Medications  Current Outpatient Medications   Medication Sig Dispense Refill    Aspirin 162 5 MG CP24 Take 81 mg by mouth daily        atorvastatin (LIPITOR) 10 mg tablet Take 10 mg by mouth daily      chlorthalidone 25 mg tablet Take 25 mg by mouth daily      metFORMIN (GLUCOPHAGE) 500 mg tablet Take 500 mg by mouth 2 (two) times a day with meals      NIFEdipine ER (ADALAT CC) 30 MG 24 hr tablet Take 30 mg by mouth daily      prednisoLONE acetate (PRED FORTE) 1 % ophthalmic suspension Apply 1 drop to eye      fluticasone (FLONASE) 50 mcg/act nasal spray as needed      latanoprost (XALATAN) 0 005 % ophthalmic solution INSTILL 1 DROP BY OPHTHALMIC ROUTE INTO INTO BOTH EYES AT BEDTIME  5    nebivolol (BYSTOLIC) 10 mg tablet Take 10 mg by mouth daily       No current facility-administered medications for this visit  Allergies  Allergies   Allergen Reactions    Penicillins Anaphylaxis       Past Medical History, Social History, Family History, medications and allergies were reviewed  Vitals  Vitals:    03/02/20 0811   BP: 124/88   BP Location: Left arm   Patient Position: Sitting   Cuff Size: Adult   Pulse: 98   Weight: 87 1 kg (192 lb)   Height: 5' 11" (1 803 m)       Physical Exam  Physical Exam  On examination he is in no acute distress  His abdomen is soft nontender nondistended   examination reveals normal phallus, scrotum and scrotal contents  A right inguinal hernia scars appreciated  There are no recurrent inguinal hernias noted  Digital rectal examination reveals a 35-40 g prostate which is smooth and firm with mild asymmetry with the left side slightly larger than the right but no nodularity or abnormality otherwise  Skin is warm  Extremities without edema    Neurologic is grossly intact and nonfocal   Gait normal   Affect normal      Results  Lab Results   Component Value Date    PSA 1 2 02/27/2020    PSA 1 2 02/27/2020    PSA 0 9 01/08/2018     Lab Results   Component Value Date    CALCIUM 8 9 01/08/2018    K 3 5 01/08/2018    CO2 29 01/08/2018     01/08/2018    BUN 20 01/08/2018    CREATININE 0 96 01/08/2018     Lab Results   Component Value Date    WBC 7 13 01/08/2018    HGB 14 7 01/08/2018    HCT 43 1 01/08/2018    MCV 90 01/08/2018     01/08/2018         Office Urine Dip  No results found for this or any previous visit (from the past 1 hour(s)) ]

## 2021-02-23 DIAGNOSIS — Z12.5 SCREENING FOR PROSTATE CANCER: Primary | ICD-10-CM

## 2021-02-23 DIAGNOSIS — N20.0 NEPHROLITHIASIS: ICD-10-CM

## 2021-02-26 ENCOUNTER — TELEPHONE (OUTPATIENT)
Dept: UROLOGY | Facility: CLINIC | Age: 66
End: 2021-02-26

## 2021-02-26 NOTE — TELEPHONE ENCOUNTER
----- Message from Sarahenrique Basilio sent at 2/24/2021 10:15 AM EST -----  Regarding: Visit Follow-Up Question  Contact: [phone]  My appointment was supposed to be with Dr Niya Dimas  I now find out that I am to see his PA, instead  This is quite discomforting for me since I was at ease with Dr Niya Dimas, who I have the utmost confidence in  Why is this switch necessary? Additionally, I am a male, and given the personal health nature of this visit, I am sure you understand how uncomfortable it might be to see a female physician for male-related urology issues  Please know that I am very disappointed and indeed, upset, by this switch

## 2021-02-26 NOTE — TELEPHONE ENCOUNTER
Called and spoke with pt  Pt very upset and disappointed regarding appt switch to [facility]  Pt no comfortable seeing a female doctor  Pt states he is willing to go to any locations  Pt rescheduled on 4/21 in [facility] with Dr Ksenia Ham   As well as placed on cancellation list

## 2021-03-10 DIAGNOSIS — Z23 ENCOUNTER FOR IMMUNIZATION: ICD-10-CM

## 2021-04-02 ENCOUNTER — APPOINTMENT (EMERGENCY)
Dept: CT IMAGING | Facility: HOSPITAL | Age: 66
End: 2021-04-02
Payer: COMMERCIAL

## 2021-04-02 ENCOUNTER — HOSPITAL ENCOUNTER (EMERGENCY)
Facility: HOSPITAL | Age: 66
Discharge: HOME | End: 2021-04-02
Attending: EMERGENCY MEDICINE
Payer: COMMERCIAL

## 2021-04-02 ENCOUNTER — APPOINTMENT (EMERGENCY)
Dept: ULTRASOUND IMAGING | Facility: HOSPITAL | Age: 66
End: 2021-04-02
Payer: COMMERCIAL

## 2021-04-02 ENCOUNTER — TELEPHONE (OUTPATIENT)
Dept: UROLOGY | Facility: MEDICAL CENTER | Age: 66
End: 2021-04-02

## 2021-04-02 VITALS
WEIGHT: 199.52 LBS | HEART RATE: 54 BPM | SYSTOLIC BLOOD PRESSURE: 157 MMHG | RESPIRATION RATE: 18 BRPM | OXYGEN SATURATION: 99 % | BODY MASS INDEX: 27.83 KG/M2 | DIASTOLIC BLOOD PRESSURE: 104 MMHG | TEMPERATURE: 97.7 F

## 2021-04-02 DIAGNOSIS — N50.82 SCROTAL PAIN: ICD-10-CM

## 2021-04-02 DIAGNOSIS — R10.9 RIGHT FLANK PAIN: Primary | ICD-10-CM

## 2021-04-02 LAB
ANION GAP SERPL CALCULATED.3IONS-SCNC: 8 MMOL/L (ref 4–13)
BASOPHILS # BLD AUTO: 0.03 THOUSANDS/ΜL (ref 0–0.1)
BASOPHILS NFR BLD AUTO: 1 % (ref 0–1)
BILIRUB UR QL STRIP: NEGATIVE
BUN SERPL-MCNC: 17 MG/DL (ref 5–25)
CALCIUM SERPL-MCNC: 8.8 MG/DL (ref 8.3–10.1)
CHLORIDE SERPL-SCNC: 103 MMOL/L (ref 100–108)
CLARITY UR: CLEAR
CO2 SERPL-SCNC: 29 MMOL/L (ref 21–32)
COLOR UR: YELLOW
COLOR, POC: YELLOW
CREAT SERPL-MCNC: 1.08 MG/DL (ref 0.6–1.3)
EOSINOPHIL # BLD AUTO: 0.58 THOUSAND/ΜL (ref 0–0.61)
EOSINOPHIL NFR BLD AUTO: 9 % (ref 0–6)
ERYTHROCYTE [DISTWIDTH] IN BLOOD BY AUTOMATED COUNT: 12.3 % (ref 11.6–15.1)
GFR SERPL CREATININE-BSD FRML MDRD: 73 ML/MIN/1.73SQ M
GLUCOSE SERPL-MCNC: 105 MG/DL (ref 65–140)
GLUCOSE UR STRIP-MCNC: NEGATIVE MG/DL
HCT VFR BLD AUTO: 44.6 % (ref 36.5–49.3)
HGB BLD-MCNC: 14.5 G/DL (ref 12–17)
HGB UR QL STRIP.AUTO: NEGATIVE
IMM GRANULOCYTES # BLD AUTO: 0.02 THOUSAND/UL (ref 0–0.2)
IMM GRANULOCYTES NFR BLD AUTO: 0 % (ref 0–2)
KETONES UR STRIP-MCNC: NEGATIVE MG/DL
LEUKOCYTE ESTERASE UR QL STRIP: NEGATIVE
LYMPHOCYTES # BLD AUTO: 1.2 THOUSANDS/ΜL (ref 0.6–4.47)
LYMPHOCYTES NFR BLD AUTO: 19 % (ref 14–44)
MCH RBC QN AUTO: 30.5 PG (ref 26.8–34.3)
MCHC RBC AUTO-ENTMCNC: 32.5 G/DL (ref 31.4–37.4)
MCV RBC AUTO: 94 FL (ref 82–98)
MONOCYTES # BLD AUTO: 0.56 THOUSAND/ΜL (ref 0.17–1.22)
MONOCYTES NFR BLD AUTO: 9 % (ref 4–12)
NEUTROPHILS # BLD AUTO: 3.78 THOUSANDS/ΜL (ref 1.85–7.62)
NEUTS SEG NFR BLD AUTO: 62 % (ref 43–75)
NITRITE UR QL STRIP: NEGATIVE
NRBC BLD AUTO-RTO: 0 /100 WBCS
PH UR STRIP.AUTO: 7 [PH] (ref 4.5–8)
PLATELET # BLD AUTO: 181 THOUSANDS/UL (ref 149–390)
PMV BLD AUTO: 10 FL (ref 8.9–12.7)
POTASSIUM SERPL-SCNC: 4 MMOL/L (ref 3.5–5.3)
PROT UR STRIP-MCNC: NEGATIVE MG/DL
RBC # BLD AUTO: 4.75 MILLION/UL (ref 3.88–5.62)
SODIUM SERPL-SCNC: 140 MMOL/L (ref 136–145)
SP GR UR STRIP.AUTO: 1.01 (ref 1–1.03)
UROBILINOGEN UR QL STRIP.AUTO: 0.2 E.U./DL
WBC # BLD AUTO: 6.17 THOUSAND/UL (ref 4.31–10.16)

## 2021-04-02 PROCEDURE — 81003 URINALYSIS AUTO W/O SCOPE: CPT

## 2021-04-02 PROCEDURE — 99284 EMERGENCY DEPT VISIT MOD MDM: CPT | Performed by: PHYSICIAN ASSISTANT

## 2021-04-02 PROCEDURE — 99284 EMERGENCY DEPT VISIT MOD MDM: CPT

## 2021-04-02 PROCEDURE — 74176 CT ABD & PELVIS W/O CONTRAST: CPT

## 2021-04-02 PROCEDURE — 36415 COLL VENOUS BLD VENIPUNCTURE: CPT | Performed by: PHYSICIAN ASSISTANT

## 2021-04-02 PROCEDURE — 96374 THER/PROPH/DIAG INJ IV PUSH: CPT

## 2021-04-02 PROCEDURE — 76870 US EXAM SCROTUM: CPT

## 2021-04-02 PROCEDURE — 80048 BASIC METABOLIC PNL TOTAL CA: CPT | Performed by: PHYSICIAN ASSISTANT

## 2021-04-02 PROCEDURE — 85025 COMPLETE CBC W/AUTO DIFF WBC: CPT | Performed by: PHYSICIAN ASSISTANT

## 2021-04-02 RX ORDER — KETOROLAC TROMETHAMINE 30 MG/ML
15 INJECTION, SOLUTION INTRAMUSCULAR; INTRAVENOUS ONCE
Status: COMPLETED | OUTPATIENT
Start: 2021-04-02 | End: 2021-04-02

## 2021-04-02 RX ADMIN — KETOROLAC TROMETHAMINE 15 MG: 30 INJECTION, SOLUTION INTRAMUSCULAR; INTRAVENOUS at 12:17

## 2021-04-02 NOTE — ED PROVIDER NOTES
History  Chief Complaint   Patient presents with    Flank Pain     Pt  reports right sided flank pain for the past two days  Pt  reports now pain travels to left side of lower abdomen  63year old male with prediabetes, HLD and HTN presents to the emergency department for right flank pain  Patient report the pain initially started 2 nights ago, as a dull ache, which he initially thought was low back pain  Patient reports that last night, the pain began radiating into his right lower abdomen into his groin  He denies any fever, dysuria, hematuria, n/v/d  He has a history of kidney stone in 2017, necessitating cysto with lithotripsy  He denies any chest pain, shortness of breath  History provided by:  Patient and medical records   used: No    Flank Pain  Pain location:  R flank  Pain quality: aching    Pain radiates to:  Scrotum  Pain severity:  Moderate  Onset quality:  Gradual  Duration:  2 days  Progression:  Worsening  Chronicity:  Recurrent  Context: not sick contacts, not suspicious food intake and not trauma    Relieved by:  Nothing  Worsened by:  Nothing  Ineffective treatments:  None tried  Associated symptoms: no anorexia, no chest pain, no chills, no diarrhea, no dysuria, no fever, no hematuria, no nausea, no shortness of breath and no vomiting    Risk factors: has not had multiple surgeries        Prior to Admission Medications   Prescriptions Last Dose Informant Patient Reported? Taking?    Aspirin 162 5 MG CP24  Self Yes Yes   Sig: Take 81 mg by mouth daily     NIFEdipine ER (ADALAT CC) 30 MG 24 hr tablet  Self Yes Yes   Sig: Take 30 mg by mouth daily   atorvastatin (LIPITOR) 10 mg tablet  Self Yes Yes   Sig: Take 10 mg by mouth daily   chlorthalidone 25 mg tablet  Self Yes Yes   Sig: Take 25 mg by mouth daily   fluticasone (FLONASE) 50 mcg/act nasal spray Not Taking at Unknown time Self Yes No   Sig: as needed   latanoprost (XALATAN) 0 005 % ophthalmic solution Not Taking at Unknown time Self Yes No   Sig: INSTILL 1 DROP BY OPHTHALMIC ROUTE INTO INTO BOTH EYES AT BEDTIME   metFORMIN (GLUCOPHAGE) 500 mg tablet  Self Yes Yes   Sig: Take 500 mg by mouth 2 (two) times a day with meals   nebivolol (BYSTOLIC) 10 mg tablet Not Taking at Unknown time Self Yes No   Sig: Take 10 mg by mouth daily   prednisoLONE acetate (PRED FORTE) 1 % ophthalmic suspension  Self Yes Yes   Sig: Apply 1 drop to eye      Facility-Administered Medications: None       Past Medical History:   Diagnosis Date    Diabetes mellitus ([address] )     Type 2    History of colon polyps     Hyperlipidemia     Hypertension     Prediabetes     Sleep apnea     Vitamin D deficiency        Past Surgical History:   Procedure Laterality Date    ARTHROSCOPIC REPAIR ACL      CATARACT EXTRACTION      COLONOSCOPY      COLONOSCOPY N/A 10/29/2018    Procedure: COLONOSCOPY with polypectomy and hemostasis clipping ;  Surgeon: Annamaria Muniz MD;  Location: [facility]; Service: Colorectal    HERNIA REPAIR      KNEE ARTHROSCOPY Left     acl repair    KNEE SURGERY      WA CYSTO/URETERO W/LITHOTRIPSY &INDWELL STENT INSRT Right 11/28/2017    Procedure: CYSTOSCOPY URETEROSCOPY WITH LITHOTRIPSY HOLMIUM LASER, RETROGRADE PYELOGRAM AND INSERTION STENT URETERAL;  Surgeon: Анна Gibson MD;  Location: [facility];  Service: Urology       Family History   Problem Relation Age of Onset    Heart attack Father     Cancer Mother         lung    Diabetes Mother      I have reviewed and agree with the history as documented  E-Cigarette/Vaping    E-Cigarette Use Never User      E-Cigarette/Vaping Substances     Social History     Tobacco Use    Smoking status: Former Smoker    Smokeless tobacco: Never Used   Substance Use Topics    Alcohol use: Yes     Alcohol/week: 2 0 standard drinks     Types: 2 Glasses of wine per week    Drug use: No       Review of Systems   Constitutional: Negative for chills and fever     Respiratory: Negative for shortness of breath  Cardiovascular: Negative for chest pain  Gastrointestinal: Negative for anorexia, diarrhea, nausea and vomiting  Genitourinary: Positive for flank pain  Negative for decreased urine volume, difficulty urinating, dysuria, hematuria, penile pain, penile swelling, scrotal swelling and urgency  Skin: Negative for rash and wound  All other systems reviewed and are negative  Physical Exam  Physical Exam  Vitals signs and nursing note reviewed  Constitutional:       General: He is not in acute distress  Appearance: He is well-developed  He is not ill-appearing or toxic-appearing  HENT:      Head: Normocephalic and atraumatic  Right Ear: Hearing and external ear normal       Left Ear: Hearing and external ear normal       Nose: Nose normal    Eyes:      Conjunctiva/sclera: Conjunctivae normal    Neck:      Musculoskeletal: Full passive range of motion without pain and neck supple  Vascular: No JVD  Trachea: No tracheal deviation  Cardiovascular:      Rate and Rhythm: Normal rate and regular rhythm  Heart sounds: Normal heart sounds, S1 normal and S2 normal    Pulmonary:      Effort: Pulmonary effort is normal       Breath sounds: Normal breath sounds  No decreased breath sounds, wheezing, rhonchi or rales  Abdominal:      General: Bowel sounds are normal       Palpations: Abdomen is soft  Tenderness: There is no abdominal tenderness  There is right CVA tenderness  There is no left CVA tenderness, guarding or rebound  Musculoskeletal:      Comments: Moves all four limbs without difficulty, crepitus, swelling, or deformity  Skin:     General: Skin is warm and dry  Findings: No rash or wound  Neurological:      Mental Status: He is alert and oriented to person, place, and time  GCS: GCS eye subscore is 4  GCS verbal subscore is 5  GCS motor subscore is 6     Psychiatric:         Mood and Affect: Mood normal          Speech: Speech normal          Vital Signs  ED Triage Vitals   Temperature Pulse Respirations Blood Pressure SpO2   04/02/21 1232 04/02/21 1028 04/02/21 1028 04/02/21 1028 04/02/21 1028   97 7 °F (36 5 °C) 64 18 (!) 160/105 100 %      Temp Source Heart Rate Source Patient Position - Orthostatic VS BP Location FiO2 (%)   04/02/21 1232 04/02/21 1028 04/02/21 1028 04/02/21 1028 --   Oral Monitor Sitting Left arm       Pain Score       04/02/21 1217       6           Vitals:    04/02/21 1028 04/02/21 1232   BP: (!) 160/105 (!) 157/104   Pulse: 64 (!) 54   Patient Position - Orthostatic VS: Sitting Sitting         Visual Acuity      ED Medications  Medications   ketorolac (TORADOL) injection 15 mg (15 mg Intravenous Given 4/2/21 1217)       Diagnostic Studies  Results Reviewed     Procedure Component Value Units Date/Time    Basic metabolic panel [[id number]] Collected: 04/02/21 1132    Lab Status: Final result Specimen: Blood from Arm, Left Updated: 04/02/21 1147     Sodium 140 mmol/L      Potassium 4 0 mmol/L      Chloride 103 mmol/L      CO2 29 mmol/L      ANION GAP 8 mmol/L      BUN 17 mg/dL      Creatinine 1 08 mg/dL      Glucose 105 mg/dL      Calcium 8 8 mg/dL      eGFR 73 ml/min/1 73sq m     Narrative:      [place] guidelines for Chronic Kidney Disease (CKD):     Stage 1 with normal or high GFR (GFR > 90 mL/min/1 73 square meters)    Stage 2 Mild CKD (GFR = 60-89 mL/min/1 73 square meters)    Stage 3A Moderate CKD (GFR = 45-59 mL/min/1 73 square meters)    Stage 3B Moderate CKD (GFR = 30-44 mL/min/1 73 square meters)    Stage 4 Severe CKD (GFR = 15-29 mL/min/1 73 square meters)    Stage 5 End Stage CKD (GFR <15 mL/min/1 73 square meters)  Note: GFR calculation is accurate only with a steady state creatinine    CBC and differential [[id number]]  (Abnormal) Collected: 04/02/21 1132    Lab Status: Final result Specimen: Blood from Arm, Left Updated: 04/02/21 1138     WBC 6 17 Thousand/uL      RBC 4 75 Million/uL      Hemoglobin 14 5 g/dL      Hematocrit 44 6 %      MCV 94 fL      MCH 30 5 pg      MCHC 32 5 g/dL      RDW 12 3 %      MPV 10 0 fL      Platelets 414 Thousands/uL      nRBC 0 /100 WBCs      Neutrophils Relative 62 %      Immat GRANS % 0 %      Lymphocytes Relative 19 %      Monocytes Relative 9 %      Eosinophils Relative 9 %      Basophils Relative 1 %      Neutrophils Absolute 3 78 Thousands/µL      Immature Grans Absolute 0 02 Thousand/uL      Lymphocytes Absolute 1 20 Thousands/µL      Monocytes Absolute 0 56 Thousand/µL      Eosinophils Absolute 0 58 Thousand/µL      Basophils Absolute 0 03 Thousands/µL     POCT urinalysis dipstick [[id number]]  (Normal) Resulted: 04/02/21 1047    Lab Status: Final result Specimen: Urine Updated: 04/02/21 1047     Color, UA Yellow    Urine Macroscopic, POC [[id number]] Collected: 04/02/21 1045    Lab Status: Final result Specimen: Urine Updated: 04/02/21 1047     Color, UA Yellow     Clarity, UA Clear     pH, UA 7 0     Leukocytes, UA Negative     Nitrite, UA Negative     Protein, UA Negative mg/dl      Glucose, UA Negative mg/dl      Ketones, UA Negative mg/dl      Urobilinogen, UA 0 2 E U /dl      Bilirubin, UA Negative     Blood, UA Negative     Specific Gravity, UA 1 015    Narrative:      CLINITEK RESULT                 US scrotum and testicles   Final Result by Meg Resendiz DO (04/02 1412)       Unremarkable testes  No evidence of torsion  Workstation performed: [id number]         CT renal stone study abdomen pelvis without contrast   Final Result by James Qureshi MD (04/02 1139)      No acute abnormality identified               Workstation performed: [id number]                    Procedures  Procedures         ED Course  ED Course as of Apr 02 1526   Fri Apr 02, 2021   1045 Leukocytes, UA: Negative   1047 Nitrite, UA: Negative   1047 Blood, UA: Negative   1047 Color, UA: Yellow   1138 WBC: 6 17   1138 Hemoglobin: 14 5   1138 Platelet Count: 423   3469 IMPRESSION:     No acute abnormality identified        CT renal stone study abdomen pelvis without contrast   1158 Sodium: 140   1158 Creatinine: 1 08   1211 Discussed labs and CT with patient and spouse; pain returned; patient declined toradol ordered earlier; will ask RN to give toradol and get US scrotum to r/o torsion      1235 Patient reports he did not take BP meds this AM   Blood Pressure(!): 157/104   1235 Pulse(!): 54   1236 Respirations: 18   1236 SpO2: 99 %   1416 IMPRESSION:     Unremarkable testes  No evidence of torsion  US scrotum and testicles                             SBIRT 20yo+      Most Recent Value   SBIRT (22 yo +)   In order to provide better care to our patients, we are screening all of our patients for alcohol and drug use  Would it be okay to ask you these screening questions? No Filed at: 04/02/2021 1132                    MDM  Number of Diagnoses or Management Options  Right flank pain:   Scrotal pain:   Diagnosis management comments: 63year old male with prediabetes, HLD and HTN presents to the emergency department for right flank pain  Labs including CBC, BMP without leukocytosis, MARIPOSA, electrolyte abnormality  CT renal stone study did not demonstrate any visualized acute abdominal process  UA with UTI, hematuria  Scrotal US without evidence torsion  DDx: musculoskeletal back pain, pyelonephritis, renal calculi, abdominal aortic aneurysm     Presentation not consistent with malignancy (lack of history of malignancy, lack of the symptoms), fracture (no trauma, no bony tenderness to palpation),  cauda equina (no bowel or urinary incontinence/retention, no saddle anesthesia, no distal weakness), AAA, viscous perforation, pulmonary embolism, renal colic, pyelonephritis (afebrile, no CVAT, no urinary symptoms)  While the cause of the patient's complaints is most likely benign, it is possible that this is the early presentation of a more serious condition  This diagnostic uncertainty was discussed with the patient, the importance of follow up care, as well as the need to return to immediately return to the closest emergency department for the concerning signs and symptoms listed in the discharge instructions  The patient stated they were aware of this diagnostic uncertainty, understood the importance of follow up and were comfortable being discharged  I believe that discharge home with outpatient follow up for further evaluation is medically appropriate  I discussed supportive care, importance of follow-up and return precautions  Patient expressed understanding  Amount and/or Complexity of Data Reviewed  Clinical lab tests: ordered and reviewed  Tests in the radiology section of CPT®: ordered        Disposition  Final diagnoses:   Right flank pain   Scrotal pain     Time reflects when diagnosis was documented in both MDM as applicable and the Disposition within this note     Time User Action Codes Description Comment    4/2/2021  2:20 PM Wandalee Fudge Add [R10 9] Right flank pain     4/2/2021  2:21 PM Wandalee Fudge Add [N50 82] Scrotal pain       ED Disposition     ED Disposition Condition Date/Time Comment    Discharge Stable Fri Apr 2, 2021  2:20 PM Shelly Hayes discharge to home/self care              Follow-up Information     Follow up With Specialties Details Why Contact Info Additional Information    Ryanne Sood MD Urology Schedule an appointment as soon as possible for a visit   [address] [facility] [phone] [id number] [address] Emergency Department Emergency Medicine Go to  If symptoms worsen [facility] [id number]  [address] [facility] Emergency Department, [address] [place] [address] [facility] [place] [address]    Aster Garcia MD Internal Medicine Schedule an appointment as soon as possible for a visit in 1 week    [address] [address]  [phone]             Discharge Medication List as of 4/2/2021  2:21 PM      CONTINUE these medications which have NOT CHANGED    Details   Aspirin 162 5 MG CP24 Take 81 mg by mouth daily  , Historical Med      atorvastatin (LIPITOR) 10 mg tablet Take 10 mg by mouth daily, Historical Med      chlorthalidone 25 mg tablet Take 25 mg by mouth daily, Historical Med      metFORMIN (GLUCOPHAGE) 500 mg tablet Take 500 mg by mouth 2 (two) times a day with meals, Historical Med      NIFEdipine ER (ADALAT CC) 30 MG 24 hr tablet Take 30 mg by mouth daily, Historical Med      prednisoLONE acetate (PRED FORTE) 1 % ophthalmic suspension Apply 1 drop to eye, Starting Sat 11/30/2019, Historical Med      fluticasone (FLONASE) 50 mcg/act nasal spray as needed, Historical Med      latanoprost (XALATAN) 0 005 % ophthalmic solution INSTILL 1 DROP BY OPHTHALMIC ROUTE INTO INTO BOTH EYES AT BEDTIME, Historical Med      nebivolol (BYSTOLIC) 10 mg tablet Take 10 mg by mouth daily, Historical Med           No discharge procedures on file      PDMP Review     None          ED Provider  Electronically Signed by           Chen Us PA-C  04/02/21 3025

## 2021-04-02 NOTE — TELEPHONE ENCOUNTER
Patient of Dr Francisco Boucher at [facility]    Patient called stating he is having pain on lower right side kidney area and he states its radiating to his right testicle  Anastasia Ava He stated he is getting a very sharp pain  He reached out to his family doctor and they recommended to go to an Urgent care  He does have a history of kidney stones and wants to know how to proceed

## 2021-04-02 NOTE — ED NOTES
Pts wife came out to ask when pt will be going for US, RN informed pt she is unsure and will speak to [address]        Jeet Cabrera RN  04/02/21 [address]  04/02/21 1314

## 2021-04-02 NOTE — TELEPHONE ENCOUNTER
Patient called stating he went to [facility]  and he had ultrasound scrotum and ct scan  He was told to follow up with Urology  He would like to follow up with Dr Kris Mckinnon    Patient can be reached at [phone] (Y)

## 2021-04-05 NOTE — TELEPHONE ENCOUNTER
Patient calling in again because he is still having severe back pain radiating to his right groin area  He said he is concerned that nobody has taken the time to call him back yet regarding this since he sent 2 messages  Advised that we are not open on the weekends  He would like his scans reviewed and and his blood work and would like to know what to do next  Please advise patient

## 2021-04-05 NOTE — TELEPHONE ENCOUNTER
Patient called regarding results  Patient advise that he would like these results right away because he thinks there is something wrong  Please advise

## 2021-04-05 NOTE — TELEPHONE ENCOUNTER
His ultrasound and CT scan were both unremarkable  If he is having severe pain in his back it may be musculoskeletal but not related to his urological system he does have a scheduled follow-up on 04/23 with Sandra Lawrence

## 2021-04-05 NOTE — TELEPHONE ENCOUNTER
Contacted and spoke with patient  Patient states the back pain radiating to the groin continues  Reviewed ER imaging with patient, CT renal stone scan and scrotal ultrasound  Both sets of imaging negative  I told patient he does not have a kidney  No blockage or masses seen  Patient requesting our office to contact his PCP with his imaging results and to send a message to Dr Kris Mckinnon too  Discussed with patient his [facility] provider should be able to review his chart in [facility] but patient states his PCP emailed him saying he cannot  Will send to Dr Kris Mckinnon for his review and then in the morning will contact Dr Jas Galindo office

## 2021-04-07 NOTE — TELEPHONE ENCOUNTER
Patient scheduled with [facility] on 04/23/2021 at the [facility] office

## 2021-04-19 ENCOUNTER — APPOINTMENT (OUTPATIENT)
Dept: LAB | Facility: MEDICAL CENTER | Age: 66
End: 2021-04-19
Payer: COMMERCIAL

## 2021-04-19 DIAGNOSIS — Z12.5 SCREENING FOR PROSTATE CANCER: ICD-10-CM

## 2021-04-19 LAB
PSA SERPL-MCNC: 1 NG/ML (ref 0–4)
PSA SERPL-MCNC: 1.1 NG/ML (ref 0–4)

## 2021-04-19 PROCEDURE — 36415 COLL VENOUS BLD VENIPUNCTURE: CPT

## 2021-04-19 PROCEDURE — G0103 PSA SCREENING: HCPCS

## 2021-04-22 ENCOUNTER — OFFICE VISIT (OUTPATIENT)
Dept: UROLOGY | Facility: AMBULATORY SURGERY CENTER | Age: 66
End: 2021-04-22
Payer: COMMERCIAL

## 2021-04-22 VITALS
BODY MASS INDEX: 27.44 KG/M2 | HEART RATE: 93 BPM | DIASTOLIC BLOOD PRESSURE: 88 MMHG | SYSTOLIC BLOOD PRESSURE: 122 MMHG | WEIGHT: 196 LBS | HEIGHT: 71 IN

## 2021-04-22 DIAGNOSIS — Z12.5 SCREENING FOR PROSTATE CANCER: Primary | ICD-10-CM

## 2021-04-22 LAB
BACTERIA UR QL AUTO: ABNORMAL /HPF
BILIRUB UR QL STRIP: NEGATIVE
CLARITY UR: CLEAR
COLOR UR: YELLOW
GLUCOSE UR STRIP-MCNC: NEGATIVE MG/DL
HGB UR QL STRIP.AUTO: ABNORMAL
HYALINE CASTS #/AREA URNS LPF: ABNORMAL /LPF
KETONES UR STRIP-MCNC: NEGATIVE MG/DL
LEUKOCYTE ESTERASE UR QL STRIP: NEGATIVE
NITRITE UR QL STRIP: NEGATIVE
NON-SQ EPI CELLS URNS QL MICRO: ABNORMAL /HPF
PH UR STRIP.AUTO: 6.5 [PH]
PROT UR STRIP-MCNC: NEGATIVE MG/DL
RBC #/AREA URNS AUTO: ABNORMAL /HPF
SP GR UR STRIP.AUTO: 1.02 (ref 1–1.03)
UROBILINOGEN UR QL STRIP.AUTO: 0.2 E.U./DL
WBC #/AREA URNS AUTO: ABNORMAL /HPF

## 2021-04-22 PROCEDURE — 81001 URINALYSIS AUTO W/SCOPE: CPT | Performed by: NURSE PRACTITIONER

## 2021-04-22 PROCEDURE — 99213 OFFICE O/P EST LOW 20 MIN: CPT | Performed by: NURSE PRACTITIONER

## 2021-04-22 NOTE — PROGRESS NOTES
04/22/21    Yohana Pinedo   1955   [id number]     Assessment  1 Nephrolithiasis  2 Prostate cancer screening  3 Erectile dysfunction    Discussion/Plan  1 Nephrolithiasis   CT renal stone study and urinalysis negative   Scrotal US: small right hydrocele, small left epididymal cyst    We reviewed conservative treatment with scrotal elevation, supportive underwear, NSAIDs for discomfort   We reviewed dietary recommendations and encouraged hydration with water  Provided dietary handout for patient to reference   Urine dip in office: trace blood, negative infection    Sent for formal microscopic analysis   Reviewed ER precautions  2 Prostate cancer screening   PSA 4/19/21 1 0   CE: patient deferred  3 Erectile dysfunction   Defers treatment at this time  Patient will return to office in 1 year with PSA and CE  He will call with any issues or concerns of stone episode  All questions answered  We will notify patient of urine results  Subjective  HPI   Yohana Pinedo is a 63 y o  male with a history of a 6 mm right proximal ureteral calculus requiring right ureteroscopy with holmium laser lithotripsy, right retrograde pyelography and right ureteral stent insertion in 2017 with Dr Meenu Ladd  He had not had recurrence of stone episodes until recently  He evaluated in the emergency department 4/2/21 for right flank pain, right groin pain, and right testicular pain  He was concerned that he may have been passing a stone  He denies hematuria or visualization of stone passage  CT scan did not reveal acute kidney stone  Urinalysis in the ER was negative  He presents to the office today for follow up  His acute symptoms have resolved  He denies any family history of prostate cancer  He has mild erectile dysfunction but is not interested in PDE 5 inhibitors at this time  He denies any significant lower urinary tract symptoms  He voids with an adequate urinary stream and empties to completion      Review of Systems - History obtained from chart review and the patient  General ROS: negative for - chills or fever  Psychological ROS: negative  Ophthalmic ROS: negative  ENT ROS: negative  Allergy and Immunology ROS: negative  Hematological and Lymphatic ROS: negative  Endocrine ROS: negative  Respiratory ROS: no cough, shortness of breath, or wheezing  Cardiovascular ROS: no chest pain or dyspnea on exertion  Gastrointestinal ROS: no abdominal pain, change in bowel habits, or black or bloody stools  Genito-Urinary ROS: negative  Musculoskeletal ROS: negative  Neurological ROS: negative  Dermatological ROS: negative       Objective  Physical Exam  Vitals signs and nursing note reviewed  Constitutional:       General: He is not in acute distress  Appearance: Normal appearance  He is well-developed and normal weight  He is not ill-appearing, toxic-appearing or diaphoretic  HENT:      Head: Normocephalic and atraumatic  Neck:      Musculoskeletal: Normal range of motion and neck supple  Pulmonary:      Effort: Pulmonary effort is normal  No respiratory distress  Abdominal:      Tenderness: There is no right CVA tenderness or left CVA tenderness  Genitourinary:     Rectum: Normal    Musculoskeletal: Normal range of motion  Skin:     General: Skin is warm and dry  Neurological:      General: No focal deficit present  Mental Status: He is alert and oriented to person, place, and time  Mental status is at baseline  Psychiatric:         Mood and Affect: Mood normal          Behavior: Behavior normal          Thought Content:  Thought content normal          Judgment: Judgment normal           Component      Latest Ref Rng & Units 1/8/2018 2/27/2020 2/27/2020 4/19/2021            2:07 PM  2:07 PM  1:00 PM   PSA, Total      0 0 - 4 0 ng/mL 0 9 1 2 1 2 1 1     Component      Latest Ref Rng & Units 4/19/2021           1:00 PM   PSA, Total      0 0 - 4 0 ng/mL 1 0       AUA SYMPTOM SCORE      Most Recent Value AUA SYMPTOM SCORE   How often have you had a sensation of not emptying your bladder completely after you finished urinating? 0   How often have you had to urinate again less than two hours after you finished urinating? 2   How often have you found you stopped and started again several times when you urinate? 2   How often have you found it difficult to postpone urination? 0   How often have you had a weak urinary stream?  1   How often have you had to push or strain to begin urination? 0   How many times did you most typically get up to urinate from the time you went to bed at night until the time you got up in the morning?   4   Quality of Life: If you were to spend the rest of your life with your urinary condition just the way it is now, how would you feel about that?  2   AUA SYMPTOM SCORE  [facility] [address]

## 2021-08-05 ENCOUNTER — EVALUATION (OUTPATIENT)
Dept: PHYSICAL THERAPY | Facility: MEDICAL CENTER | Age: 66
End: 2021-08-05
Payer: COMMERCIAL

## 2021-08-05 DIAGNOSIS — M25.562 LEFT KNEE PAIN, UNSPECIFIED CHRONICITY: Primary | ICD-10-CM

## 2021-08-05 PROCEDURE — 97161 PT EVAL LOW COMPLEX 20 MIN: CPT | Performed by: PHYSICAL THERAPIST

## 2021-08-05 PROCEDURE — 97110 THERAPEUTIC EXERCISES: CPT | Performed by: PHYSICAL THERAPIST

## 2021-08-05 PROCEDURE — 97140 MANUAL THERAPY 1/> REGIONS: CPT | Performed by: PHYSICAL THERAPIST

## 2021-08-05 NOTE — LETTER
2021    Francia Dubois MD  [address] [address]    Patient: Esteban Weber   YOB: 1955   Date of Visit: 2021     Encounter Diagnosis     ICD-10-CM    1  Left knee pain, unspecified chronicity  M25 562        Dear Dr Maci Leon: Thank you for your recent referral of Esteban Weber  Please review the attached evaluation summary from Fabio's recent visit  Please verify that you agree with the plan of care by signing the attached order  If you have any questions or concerns, please do not hesitate to call  I sincerely appreciate the opportunity to share in the care of one of your patients and hope to have another opportunity to work with you in the near future  Sincerely,    Zain Alanis, PT      Referring Provider:      I certify that I have read the below Plan of Care and certify the need for these services furnished under this plan of treatment while under my care  Francia Dubois MD  [facility]  Via Fax: [phone]          PT Evaluation     Today's date: 2021  Patient name: Esteban Weber  : 1955  MRN: [id number]  Referring provider: Bulmaro Parra MD  Dx:   Encounter Diagnosis     ICD-10-CM    1  Left knee pain, unspecified chronicity  M25 562                   Assessment  Assessment details: Pt is a pleasant 62 yo male presenting to physical therapy with L knee pain  Pt would benefit from skilled PT to address current impairments and maximize pts function     Understanding of Dx/Px/POC: good   Prognosis: good    Goals  Impairment Goals  - Pt I with initial HEP in 1-2 visits  - Increase strength to 5/5 in all affected areas in 4-6 week    Functional Goals  - Increase FOTO to at least 79 in 6 weeks  - Patient will be independent with comprehensive HEP in 6 weeks  - Stair descent is improved to prior level of function and painfree in 6 weeks      Plan  Patient would benefit from: skilled physical therapy  Other planned modality interventions: Modalities prn   Planned therapy interventions: manual therapy, patient education, neuromuscular re-education, postural training, strengthening, stretching, therapeutic activities, therapeutic exercise and home exercise program  Frequency: 2x week  Duration in weeks: 6  Treatment plan discussed with: patient        Subjective Evaluation    History of Present Illness  Mechanism of injury: Pt with L anterior knee pain for the past 2 5 months  The pain started for no apparent reason  He thinks that maybe it had to do with sitting cross legged in his chair whilst working  Pt has pain when descending stairs  Pt with previous history of L knee ACL recon in   Pt states that he never got his muscle mass back  He also had eye surgery and had to spend much of his time in prone, which caused some of his muscle mass to decrease      Pain  Current pain ratin  At best pain ratin  At worst pain ratin    Social Support    Employment status: working (Professor)  Exercise history: Elliptical, occasional running, pt stopped going to the gym due to COVID      Diagnostic Tests  X-ray: abnormal  Treatments  No previous or current treatments  Patient Goals  Patient goals for therapy: decreased pain and increased strength  Patient goal: Increase L thigh muscles mass        Objective     Observations     Additional Observation Details  Gait is unremarkable    Functional squat: Good depth and no wt shift noted    Balance is WNL and = B with EO and EC    + LLD L > R    Palpation     Additional Palpation Details  - TTP L knee    Neurological Testing     Sensation     Knee   Left Knee   Intact: light touch    Right Knee   Intact: light touch     Active Range of Motion   Left Knee   Normal active range of motion    Right Knee   Normal active range of motion    Mobility   Patellar Mobility:   Left Knee   WFL: medial, lateral, superior and inferior       Right Knee   WFL: medial, lateral, superior and inferior    Strength/Myotome Testing     Left Hip   Planes of Motion   Flexion: 3+  Extension: 3+  Abduction: 3+  Adduction: 4    Isolated Muscles   Gluteus curtis: 3+    Right Hip   Planes of Motion   Flexion: 4+  Extension: 5  Abduction: 5  Adduction: 5    Isolated Muscles   Gluteus maximums: 5    Left Knee   Prone flexion: 3+  Extension: 5    Right Knee   Prone flexion: 5  Extension: 5    Additional Strength Details  + L quad atrophy noted             Precautions: None      Manuals 8/5            Long axis traction L LE HK                                                   Ther Ex 8/5            Bike NV            Bridging x30            Add sq 5"  x30            T-band abd x30            Heel pushes 5"  x30            QS NV            SLR flexion NV            LAQ NV            Standing hip abd NV

## 2021-08-05 NOTE — PROGRESS NOTES
PT Evaluation     Today's date: 2021  Patient name: Jayjay Resendiz  : 1955  MRN: [id number]  Referring provider: Raman Wilkerson MD  Dx:   Encounter Diagnosis     ICD-10-CM    1  Left knee pain, unspecified chronicity  M25 562                   Assessment  Assessment details: Pt is a pleasant 64 yo male presenting to physical therapy with L knee pain  Pt would benefit from skilled PT to address current impairments and maximize pts function  Understanding of Dx/Px/POC: good   Prognosis: good    Goals  Impairment Goals  - Pt I with initial HEP in 1-2 visits  - Increase strength to 5/5 in all affected areas in 4-6 week    Functional Goals  - Increase FOTO to at least 79 in 6 weeks  - Patient will be independent with comprehensive HEP in 6 weeks  - Stair descent is improved to prior level of function and painfree in 6 weeks      Plan  Patient would benefit from: skilled physical therapy  Other planned modality interventions: Modalities prn   Planned therapy interventions: manual therapy, patient education, neuromuscular re-education, postural training, strengthening, stretching, therapeutic activities, therapeutic exercise and home exercise program  Frequency: 2x week  Duration in weeks: 6  Treatment plan discussed with: patient        Subjective Evaluation    History of Present Illness  Mechanism of injury: Pt with L anterior knee pain for the past 2 5 months  The pain started for no apparent reason  He thinks that maybe it had to do with sitting cross legged in his chair whilst working  Pt has pain when descending stairs  Pt with previous history of L knee ACL recon in   Pt states that he never got his muscle mass back  He also had eye surgery and had to spend much of his time in prone, which caused some of his muscle mass to decrease      Pain  Current pain ratin  At best pain ratin  At worst pain ratin    Social Support    Employment status: working (Professor)  Exercise history: Elliptical, occasional running, pt stopped going to the gym due to COVID      Diagnostic Tests  X-ray: abnormal  Treatments  No previous or current treatments  Patient Goals  Patient goals for therapy: decreased pain and increased strength  Patient goal: Increase L thigh muscles mass        Objective     Observations     Additional Observation Details  Gait is unremarkable    Functional squat: Good depth and no wt shift noted    Balance is WNL and = B with EO and EC    + LLD L > R    Palpation     Additional Palpation Details  - TTP L knee    Neurological Testing     Sensation     Knee   Left Knee   Intact: light touch    Right Knee   Intact: light touch     Active Range of Motion   Left Knee   Normal active range of motion    Right Knee   Normal active range of motion    Mobility   Patellar Mobility:   Left Knee   WFL: medial, lateral, superior and inferior       Right Knee   WFL: medial, lateral, superior and inferior    Strength/Myotome Testing     Left Hip   Planes of Motion   Flexion: 3+  Extension: 3+  Abduction: 3+  Adduction: 4    Isolated Muscles   Gluteus curtis: 3+    Right Hip   Planes of Motion   Flexion: 4+  Extension: 5  Abduction: 5  Adduction: 5    Isolated Muscles   Gluteus maximums: 5    Left Knee   Prone flexion: 3+  Extension: 5    Right Knee   Prone flexion: 5  Extension: 5    Additional Strength Details  + L quad atrophy noted             Precautions: None      Manuals 8/5            Long axis traction L LE HK                                                   Ther Ex 8/5            Bike NV            Bridging x30            Add sq 5"  x30            T-band abd x30            Heel pushes 5"  x30            QS NV            SLR flexion NV            LAQ NV            Standing hip abd NV

## 2021-08-09 ENCOUNTER — OFFICE VISIT (OUTPATIENT)
Dept: PHYSICAL THERAPY | Facility: MEDICAL CENTER | Age: 66
End: 2021-08-09
Payer: COMMERCIAL

## 2021-08-09 DIAGNOSIS — M25.562 LEFT KNEE PAIN, UNSPECIFIED CHRONICITY: Primary | ICD-10-CM

## 2021-08-09 PROCEDURE — 97140 MANUAL THERAPY 1/> REGIONS: CPT | Performed by: PHYSICAL THERAPIST

## 2021-08-09 PROCEDURE — 97112 NEUROMUSCULAR REEDUCATION: CPT | Performed by: PHYSICAL THERAPIST

## 2021-08-09 PROCEDURE — 97110 THERAPEUTIC EXERCISES: CPT | Performed by: PHYSICAL THERAPIST

## 2021-08-09 NOTE — PROGRESS NOTES
Daily Note     Today's date: 2021  Patient name: Dee Carreon  : 1955  MRN: [id number]  Referring provider: Tony Rice MD  Dx:   Encounter Diagnosis     ICD-10-CM    1  Left knee pain, unspecified chronicity  M25 562          Subjective: Pt reports that his knee and even his lower back is feeling better since his last PT visit  Objective: See treatment diary below    + LLD, corrected post tx    Assessment: Tolerated treatment well  Patient demonstrated fatigue post treatment, exhibited good technique with therapeutic exercises and would benefit from continued PT  Plan: Continue per plan of care        Precautions: None      Manuals            Long axis traction L LE HK HK                                                  Ther Ex            Bike NV 10'           Bridging x30 x30           Add sq 5"  x30 5"  x30           T-band abd x30 x30           Heel pushes 5"  x30 5"  x30           QS NV 5"  x30           SLR flexion NV 3x10           LAQ NV NV           Standing hip abd NV x30           Standing DF  x50

## 2021-08-12 ENCOUNTER — APPOINTMENT (OUTPATIENT)
Dept: PHYSICAL THERAPY | Facility: MEDICAL CENTER | Age: 66
End: 2021-08-12
Payer: COMMERCIAL

## 2021-08-13 ENCOUNTER — APPOINTMENT (OUTPATIENT)
Dept: PHYSICAL THERAPY | Facility: MEDICAL CENTER | Age: 66
End: 2021-08-13
Payer: COMMERCIAL

## 2021-08-16 ENCOUNTER — OFFICE VISIT (OUTPATIENT)
Dept: PHYSICAL THERAPY | Facility: MEDICAL CENTER | Age: 66
End: 2021-08-16
Payer: COMMERCIAL

## 2021-08-16 DIAGNOSIS — M25.562 LEFT KNEE PAIN, UNSPECIFIED CHRONICITY: Primary | ICD-10-CM

## 2021-08-16 PROCEDURE — 97140 MANUAL THERAPY 1/> REGIONS: CPT | Performed by: PHYSICAL THERAPIST

## 2021-08-16 PROCEDURE — 97112 NEUROMUSCULAR REEDUCATION: CPT | Performed by: PHYSICAL THERAPIST

## 2021-08-16 PROCEDURE — 97110 THERAPEUTIC EXERCISES: CPT | Performed by: PHYSICAL THERAPIST

## 2021-08-16 NOTE — PROGRESS NOTES
Daily Note     Today's date: 2021  Patient name: Joel Holden  : 1955  MRN: [id number]  Referring provider: Karen Almanza MD  Dx:   Encounter Diagnosis     ICD-10-CM    1  Left knee pain, unspecified chronicity  M25 562          Subjective: Pt reports that his knee has not been giving him any regular trouble  Objective: See treatment diary below      Assessment: Tolerated treatment well  Patient demonstrated fatigue post treatment, exhibited good technique with therapeutic exercises and would benefit from continued PT  Plan: Continue per plan of care        Precautions: None      Manuals           Long axis traction L LE HK HK HK                                                 Ther Ex           Bike NV 10' 10'          Bridging x30 x30 x40          Add sq 5"  x30 5"  x30 With br  5"  x30          T-band abd x30 x30 x30  With br          Heel pushes 5"  x30 5"  x30 5"  x30          QS NV 5"  x30 5"  x30          SLR flexion NV 3x10 3x15          LAQ NV NV x30          Standing hip abd NV x30 x30          Standing DF  x50 x50

## 2021-08-27 ENCOUNTER — OFFICE VISIT (OUTPATIENT)
Dept: PHYSICAL THERAPY | Facility: MEDICAL CENTER | Age: 66
End: 2021-08-27
Payer: COMMERCIAL

## 2021-08-27 DIAGNOSIS — M25.562 LEFT KNEE PAIN, UNSPECIFIED CHRONICITY: Primary | ICD-10-CM

## 2021-08-27 PROCEDURE — 97110 THERAPEUTIC EXERCISES: CPT | Performed by: PHYSICAL THERAPIST

## 2021-08-27 PROCEDURE — 97112 NEUROMUSCULAR REEDUCATION: CPT | Performed by: PHYSICAL THERAPIST

## 2021-08-27 PROCEDURE — 97140 MANUAL THERAPY 1/> REGIONS: CPT | Performed by: PHYSICAL THERAPIST

## 2021-08-27 NOTE — PROGRESS NOTES
Discharge Summary     Today's date: 2021  Patient name: Cristiana Alex  : 1955  MRN: [id number]  Referring provider: Hunter Castañeda MD  Dx:   Encounter Diagnosis     ICD-10-CM    1  Left knee pain, unspecified chronicity  M25 562          Subjective: Pt reports that he is doing well  He is not having any trouble with his knee  Objective: See treatment diary below      Assessment: Cristiana Alex has been compliant with attending PT and home exercise program since initial eval   Cesia Britton  has made improvements in objective data since initial evalulation and has achieved all goals  Patient reports having returned to their prior level or function  It was mutually agreed to Discharge to home exercise program at this time          Plan: D/C PT     Precautions: None      Manuals          Long axis traction L LE HK HK HK HK                                                Ther Ex          Bike NV 10' 10' 10'         Bridging x30 x30 x40 x40         Add sq 5"  x30 5"  x30 With br  5"  x30 5"  x40           T-band abd x30 x30 x30  With br x40         Heel pushes 5"  x30 5"  x30 5"  x30 5"  x30         QS NV 5"  x30 5"  x30 5"  x30         SLR flexion NV 3x10 3x15 x30         LAQ NV NV x30 NP         Standing hip abd NV x30 x30 NP         Standing DF  x50 x50

## 2022-02-09 ENCOUNTER — LAB REQUISITION (OUTPATIENT)
Dept: LAB | Facility: HOSPITAL | Age: 67
End: 2022-02-09
Payer: COMMERCIAL

## 2022-02-09 DIAGNOSIS — Z12.11 ENCOUNTER FOR SCREENING FOR MALIGNANT NEOPLASM OF COLON: ICD-10-CM

## 2022-02-09 DIAGNOSIS — Z86.010 PERSONAL HISTORY OF COLONIC POLYPS: ICD-10-CM

## 2022-02-09 PROCEDURE — 88305 TISSUE EXAM BY PATHOLOGIST: CPT | Performed by: PATHOLOGY

## 2023-03-21 ENCOUNTER — HOSPITAL ENCOUNTER (EMERGENCY)
Facility: HOSPITAL | Age: 68
Discharge: HOME | End: 2023-03-21
Attending: EMERGENCY MEDICINE

## 2023-03-21 ENCOUNTER — APPOINTMENT (EMERGENCY)
Dept: RADIOLOGY | Facility: HOSPITAL | Age: 68
End: 2023-03-21

## 2023-03-21 VITALS
HEART RATE: 96 BPM | SYSTOLIC BLOOD PRESSURE: 141 MMHG | DIASTOLIC BLOOD PRESSURE: 88 MMHG | OXYGEN SATURATION: 99 % | TEMPERATURE: 98.4 F | RESPIRATION RATE: 18 BRPM

## 2023-03-21 DIAGNOSIS — N13.30 HYDRONEPHROSIS: ICD-10-CM

## 2023-03-21 DIAGNOSIS — N20.0 NEPHROLITHIASIS: Primary | ICD-10-CM

## 2023-03-21 LAB
ALBUMIN SERPL BCP-MCNC: 4.1 G/DL (ref 3.5–5)
ALP SERPL-CCNC: 91 U/L (ref 46–116)
ALT SERPL W P-5'-P-CCNC: 29 U/L (ref 12–78)
ANION GAP SERPL CALCULATED.3IONS-SCNC: 3 MMOL/L (ref 4–13)
AST SERPL W P-5'-P-CCNC: 26 U/L (ref 5–45)
ATRIAL RATE: 83 BPM
BACTERIA UR QL AUTO: ABNORMAL /HPF
BASOPHILS # BLD AUTO: 0.04 THOUSANDS/ÂΜL (ref 0–0.1)
BASOPHILS NFR BLD AUTO: 1 % (ref 0–1)
BILIRUB SERPL-MCNC: 0.46 MG/DL (ref 0.2–1)
BILIRUB UR QL STRIP: NEGATIVE
BUN SERPL-MCNC: 25 MG/DL (ref 5–25)
CALCIUM SERPL-MCNC: 8.8 MG/DL (ref 8.3–10.1)
CHLORIDE SERPL-SCNC: 106 MMOL/L (ref 96–108)
CLARITY UR: CLEAR
CO2 SERPL-SCNC: 26 MMOL/L (ref 21–32)
COLOR UR: YELLOW
CREAT SERPL-MCNC: 1.23 MG/DL (ref 0.6–1.3)
EOSINOPHIL # BLD AUTO: 0.31 THOUSAND/ÂΜL (ref 0–0.61)
EOSINOPHIL NFR BLD AUTO: 4 % (ref 0–6)
ERYTHROCYTE [DISTWIDTH] IN BLOOD BY AUTOMATED COUNT: 12.1 % (ref 11.6–15.1)
GFR SERPL CREATININE-BSD FRML MDRD: 61 ML/MIN/1.73SQ M
GLUCOSE SERPL-MCNC: 168 MG/DL (ref 65–140)
GLUCOSE UR STRIP-MCNC: ABNORMAL MG/DL
HCT VFR BLD AUTO: 48.5 % (ref 36.5–49.3)
HGB BLD-MCNC: 15.8 G/DL (ref 12–17)
HGB UR QL STRIP.AUTO: ABNORMAL
IMM GRANULOCYTES # BLD AUTO: 0.03 THOUSAND/UL (ref 0–0.2)
IMM GRANULOCYTES NFR BLD AUTO: 0 % (ref 0–2)
KETONES UR STRIP-MCNC: NEGATIVE MG/DL
LEUKOCYTE ESTERASE UR QL STRIP: NEGATIVE
LYMPHOCYTES # BLD AUTO: 1.52 THOUSANDS/ÂΜL (ref 0.6–4.47)
LYMPHOCYTES NFR BLD AUTO: 19 % (ref 14–44)
MCH RBC QN AUTO: 30.2 PG (ref 26.8–34.3)
MCHC RBC AUTO-ENTMCNC: 32.6 G/DL (ref 31.4–37.4)
MCV RBC AUTO: 93 FL (ref 82–98)
MONOCYTES # BLD AUTO: 0.48 THOUSAND/ÂΜL (ref 0.17–1.22)
MONOCYTES NFR BLD AUTO: 6 % (ref 4–12)
NEUTROPHILS # BLD AUTO: 5.74 THOUSANDS/ÂΜL (ref 1.85–7.62)
NEUTS SEG NFR BLD AUTO: 70 % (ref 43–75)
NITRITE UR QL STRIP: NEGATIVE
NON-SQ EPI CELLS URNS QL MICRO: ABNORMAL /HPF
NRBC BLD AUTO-RTO: 0 /100 WBCS
P AXIS: 71 DEGREES
PH UR STRIP.AUTO: 5.5 [PH] (ref 4.5–8)
PLATELET # BLD AUTO: 178 THOUSANDS/UL (ref 149–390)
PMV BLD AUTO: 9.8 FL (ref 8.9–12.7)
POTASSIUM SERPL-SCNC: 3.7 MMOL/L (ref 3.5–5.3)
PR INTERVAL: 144 MS
PROT SERPL-MCNC: 8.3 G/DL (ref 6.4–8.4)
PROT UR STRIP-MCNC: ABNORMAL MG/DL
QRS AXIS: 19 DEGREES
QRSD INTERVAL: 100 MS
QT INTERVAL: 400 MS
QTC INTERVAL: 470 MS
RBC # BLD AUTO: 5.23 MILLION/UL (ref 3.88–5.62)
RBC #/AREA URNS AUTO: ABNORMAL /HPF
SODIUM SERPL-SCNC: 135 MMOL/L (ref 135–147)
SP GR UR STRIP.AUTO: >=1.03 (ref 1–1.03)
T WAVE AXIS: 73 DEGREES
UROBILINOGEN UR QL STRIP.AUTO: 0.2 E.U./DL
VENTRICULAR RATE: 83 BPM
WBC # BLD AUTO: 8.12 THOUSAND/UL (ref 4.31–10.16)
WBC #/AREA URNS AUTO: ABNORMAL /HPF

## 2023-03-21 RX ORDER — ONDANSETRON 2 MG/ML
4 INJECTION INTRAMUSCULAR; INTRAVENOUS ONCE
Status: COMPLETED | OUTPATIENT
Start: 2023-03-21 | End: 2023-03-21

## 2023-03-21 RX ORDER — KETOROLAC TROMETHAMINE 30 MG/ML
15 INJECTION, SOLUTION INTRAMUSCULAR; INTRAVENOUS ONCE
Status: COMPLETED | OUTPATIENT
Start: 2023-03-21 | End: 2023-03-21

## 2023-03-21 RX ORDER — OXYCODONE HYDROCHLORIDE 10 MG/1
5 TABLET ORAL EVERY 6 HOURS PRN
Qty: 5 TABLET | Refills: 0 | Status: SHIPPED | OUTPATIENT
Start: 2023-03-21

## 2023-03-21 RX ORDER — HYDROMORPHONE HCL/PF 1 MG/ML
0.5 SYRINGE (ML) INJECTION ONCE
Status: COMPLETED | OUTPATIENT
Start: 2023-03-21 | End: 2023-03-21

## 2023-03-21 RX ORDER — TAMSULOSIN HYDROCHLORIDE 0.4 MG/1
0.4 CAPSULE ORAL
Qty: 14 CAPSULE | Refills: 0 | Status: SHIPPED | OUTPATIENT
Start: 2023-03-21

## 2023-03-21 RX ADMIN — ONDANSETRON 4 MG: 2 INJECTION INTRAMUSCULAR; INTRAVENOUS at 11:07

## 2023-03-21 RX ADMIN — HYDROMORPHONE HYDROCHLORIDE 0.5 MG: 1 INJECTION, SOLUTION INTRAMUSCULAR; INTRAVENOUS; SUBCUTANEOUS at 11:10

## 2023-03-21 RX ADMIN — KETOROLAC TROMETHAMINE 15 MG: 30 INJECTION, SOLUTION INTRAMUSCULAR; INTRAVENOUS at 11:12

## 2023-03-21 RX ADMIN — HYDROMORPHONE HYDROCHLORIDE 0.5 MG: 1 INJECTION, SOLUTION INTRAMUSCULAR; INTRAVENOUS; SUBCUTANEOUS at 12:21

## 2023-03-21 RX ADMIN — IOHEXOL 100 ML: 350 INJECTION, SOLUTION INTRAVENOUS at 12:04

## 2023-03-21 NOTE — ED ATTENDING ATTESTATION
3/21/2023  IJamia DO, saw and evaluated the patient  I have discussed the patient with the resident/non-physician practitioner and agree with the resident's/non-physician practitioner's findings, Plan of Care, and MDM as documented in the resident's/non-physician practitioner's note, except where noted  All available labs and Radiology studies were reviewed  I was present for key portions of any procedure(s) performed by the resident/non-physician practitioner and I was immediately available to provide assistance  At this point I agree with the current assessment done in the Emergency Department  I have conducted an independent evaluation of this patient a history and physical is as follows:    Chief Complaint   Patient presents with   • Flank Pain     Pt reports left side flank pain since 0700 this morning with nausea, denies trouble urinating  Hx of kidney stones, feels similar  Denies cp/sob     44-year-old male presents with left-sided flank pain  Patient states it started around 830 this morning  Feels similar to when he had a kidney stone on the other side  Denies difficulty with urination, no fevers  His last kidney stone he required an intervention to pass it  On exam-no acute distress, heart regular, no respiratory distress, left-sided CVA tenderness and mild tenderness in the left lower quadrant    Plan-suspect ureterolithiasis, will treat with pain medication, CT stone study, check urine and labs, pain control and reassess    ED Course         Critical Care Time  Procedures

## 2023-03-21 NOTE — DISCHARGE INSTRUCTIONS
You have been evaluated in the Emergency Department today for abdominal pain  Your evaluation suggests your pain is from a kidney stone  Please follow up with urology  Call to schedule your appointment  Return to the Emergency Department if you experience worsening or uncontrolled pain, fevers 100 4°F or greater, recurrent vomiting, inability to tolerate food or fluids by mouth, bloody stools or vomit, black or tarry stools, or any other concerning symptoms

## 2023-03-22 NOTE — ED PROVIDER NOTES
History  Chief Complaint   Patient presents with   • Flank Pain     Pt reports left side flank pain since 0700 this morning with nausea, denies trouble urinating  Hx of kidney stones, feels similar  Denies cp/sob     Patient is a 68-year-old male, past medical history including diabetes, hyperlipidemia, hypertension, and nephrolithiasis, who presents to the emergency department for left flank pain  Pain started suddenly at 7 AM this morning  Describes it as a sharp pain  Has started to radiate down towards his left groin/abdomen  No modifying factors  Associated symptoms include nausea  No fevers or chills  No difficulty urinating  Denies any chest pain or shortness of breath  States this feels similar to his prior kidney stone  No other complaints or concerns at this time  Prior imaging reviewed  Patient underwent a CT scan of his abdomen pelvis on 11/11/2017  Was found to have a 6 mm right obstructing proximal ureteral calculus causing right hydroureteronephrosis  This required surgical removal           Prior to Admission Medications   Prescriptions Last Dose Informant Patient Reported? Taking?    Aspirin 162 5 MG CP24   Yes No   Sig: Take 81 mg by mouth daily     NIFEdipine ER (ADALAT CC) 30 MG 24 hr tablet   Yes No   Sig: Take 30 mg by mouth daily   atorvastatin (LIPITOR) 10 mg tablet   Yes No   Sig: Take 10 mg by mouth daily   chlorthalidone 25 mg tablet   Yes No   Sig: Take 25 mg by mouth daily   fluticasone (FLONASE) 50 mcg/act nasal spray   Yes No   Sig: as needed   latanoprost (XALATAN) 0 005 % ophthalmic solution   Yes No   Sig: INSTILL 1 DROP BY OPHTHALMIC ROUTE INTO INTO BOTH EYES AT BEDTIME   metFORMIN (GLUCOPHAGE) 500 mg tablet   Yes No   Sig: Take 500 mg by mouth 2 (two) times a day with meals Once a day   nebivolol (BYSTOLIC) 10 mg tablet   Yes No   Sig: Take 10 mg by mouth daily   prednisoLONE acetate (PRED FORTE) 1 % ophthalmic suspension   Yes No   Sig: Apply 1 drop to eye Facility-Administered Medications: None       Past Medical History:   Diagnosis Date   • Diabetes mellitus ([facility] [place] [address] )     Type 2   • History of colon polyps    • Hyperlipidemia    • Hypertension    • Prediabetes    • Sleep apnea    • Vitamin D deficiency        Past Surgical History:   Procedure Laterality Date   • ARTHROSCOPIC REPAIR ACL     • CATARACT EXTRACTION     • COLONOSCOPY     • COLONOSCOPY N/A 10/29/2018    Procedure: COLONOSCOPY with polypectomy and hemostasis clipping ;  Surgeon: Anna Rojas MD;  Location: [facility]; Service: Colorectal   • HERNIA REPAIR     • KNEE ARTHROSCOPY Left     acl repair   • KNEE SURGERY     • NV CYSTO/URETERO W/LITHOTRIPSY &INDWELL STENT INSRT Right 11/28/2017    Procedure: CYSTOSCOPY URETEROSCOPY WITH LITHOTRIPSY HOLMIUM LASER, RETROGRADE PYELOGRAM AND INSERTION STENT URETERAL;  Surgeon: Bhavana Chaparro MD;  Location: [facility];  Service: Urology       Family History   Problem Relation Age of Onset   • Heart attack Father    • Cancer Mother         lung   • Diabetes Mother      I have reviewed and agree with the history as documented  E-Cigarette/Vaping   • E-Cigarette Use Never User      E-Cigarette/Vaping Substances     Social History     Tobacco Use   • Smoking status: Former   • Smokeless tobacco: Never   Vaping Use   • Vaping Use: Never used   Substance Use Topics   • Alcohol use: Yes     Alcohol/week: 2 0 standard drinks     Types: 2 Glasses of wine per week   • Drug use: No        Review of Systems   Constitutional: Negative for chills and fever  Respiratory: Negative for shortness of breath  Cardiovascular: Negative for chest pain  Gastrointestinal: Positive for abdominal pain and nausea  Negative for diarrhea and vomiting  Genitourinary: Negative for difficulty urinating and dysuria  All other systems reviewed and are negative        Physical Exam  ED Triage Vitals [03/21/23 1022]   Temperature Pulse Respirations Blood Pressure SpO2   98 4 °F (36 9 °C) 74 18 (!) 192/96 99 %      Temp Source Heart Rate Source Patient Position - Orthostatic VS BP Location FiO2 (%)   Tympanic Monitor Sitting Left arm --      Pain Score       9             Orthostatic Vital Signs  Vitals:    03/21/23 1022 03/21/23 1110 03/21/23 1330   BP: (!) 192/96 (!) 172/104 141/88   Pulse: 74 91 96   Patient Position - Orthostatic VS: Sitting         Physical Exam  Vitals and nursing note reviewed  Constitutional:       General: He is in acute distress  Appearance: He is well-developed  He is not ill-appearing, toxic-appearing or diaphoretic  HENT:      Head: Normocephalic and atraumatic  Right Ear: External ear normal       Left Ear: External ear normal       Nose: Nose normal    Eyes:      General: Lids are normal  No scleral icterus  Cardiovascular:      Rate and Rhythm: Normal rate and regular rhythm  Heart sounds: Normal heart sounds  No murmur heard  No friction rub  No gallop  Pulmonary:      Effort: Pulmonary effort is normal  No respiratory distress  Breath sounds: Normal breath sounds  No wheezing or rales  Abdominal:      Palpations: Abdomen is soft  Tenderness: There is no abdominal tenderness  There is left CVA tenderness  There is no guarding or rebound  Musculoskeletal:         General: No deformity  Normal range of motion  Cervical back: Normal range of motion and neck supple  Skin:     General: Skin is warm and dry  Neurological:      General: No focal deficit present  Mental Status: He is alert     Psychiatric:         Mood and Affect: Mood normal          Behavior: Behavior normal          ED Medications  Medications   HYDROmorphone (DILAUDID) injection 0 5 mg (0 5 mg Intravenous Given 3/21/23 1110)   ketorolac (TORADOL) injection 15 mg (15 mg Intravenous Given 3/21/23 1112)   ondansetron (ZOFRAN) injection 4 mg (4 mg Intravenous Given 3/21/23 1107)   HYDROmorphone (DILAUDID) injection 0 5 mg (0 5 mg Intravenous Given 3/21/23 1221)   iohexol (OMNIPAQUE) 350 MG/ML injection (SINGLE-DOSE) 100 mL (100 mL Intravenous Given 3/21/23 1204)       Diagnostic Studies  Results Reviewed     Procedure Component Value Units Date/Time    Comprehensive metabolic panel [[id number]]  (Abnormal) Collected: 03/21/23 1108    Lab Status: Final result Specimen: Blood from Arm, Left Updated: 03/21/23 1150     Sodium 135 mmol/L      Potassium 3 7 mmol/L      Chloride 106 mmol/L      CO2 26 mmol/L      ANION GAP 3 mmol/L      BUN 25 mg/dL      Creatinine 1 23 mg/dL      Glucose 168 mg/dL      Calcium 8 8 mg/dL      AST 26 U/L      ALT 29 U/L      Alkaline Phosphatase 91 U/L      Total Protein 8 3 g/dL      Albumin 4 1 g/dL      Total Bilirubin 0 46 mg/dL      eGFR 61 ml/min/1 73sq m     Narrative:      [place] guidelines for Chronic Kidney Disease (CKD):   •  Stage 1 with normal or high GFR (GFR > 90 mL/min/1 73 square meters)  •  Stage 2 Mild CKD (GFR = 60-89 mL/min/1 73 square meters)  •  Stage 3A Moderate CKD (GFR = 45-59 mL/min/1 73 square meters)  •  Stage 3B Moderate CKD (GFR = 30-44 mL/min/1 73 square meters)  •  Stage 4 Severe CKD (GFR = 15-29 mL/min/1 73 square meters)  •  Stage 5 End Stage CKD (GFR <15 mL/min/1 73 square meters)  Note: GFR calculation is accurate only with a steady state creatinine    Urine Microscopic [[id number]]  (Abnormal) Collected: 03/21/23 1118    Lab Status: Final result Specimen: Urine, Clean Catch Updated: 03/21/23 1140     RBC, UA 2-4 /hpf      WBC, UA 1-2 /hpf      Epithelial Cells None Seen /hpf      Bacteria, UA None Seen /hpf     Urine Macroscopic, POC [[id number]]  (Abnormal) Collected: 03/21/23 1118    Lab Status: Final result Specimen: Urine Updated: 03/21/23 1120     Color, UA Yellow     Clarity, UA Clear     pH, UA 5 5     Leukocytes, UA Negative     Nitrite, UA Negative     Protein, UA Trace mg/dl      Glucose,  (1/10%) mg/dl      Ketones, UA Negative mg/dl      Urobilinogen, UA 0 2 E U /dl      Bilirubin, UA Negative     Occult Blood, UA Small     Specific [place], UA >=1 030    Narrative:      CLINITEK RESULT    CBC and differential [[id number]] Collected: 03/21/23 1108    Lab Status: Final result Specimen: Blood from Arm, Left Updated: 03/21/23 1115     WBC 8 12 Thousand/uL      RBC 5 23 Million/uL      Hemoglobin 15 8 g/dL      Hematocrit 48 5 %      MCV 93 fL      MCH 30 2 pg      MCHC 32 6 g/dL      RDW 12 1 %      MPV 9 8 fL      Platelets 623 Thousands/uL      nRBC 0 /100 WBCs      Neutrophils Relative 70 %      Immat GRANS % 0 %      Lymphocytes Relative 19 %      Monocytes Relative 6 %      Eosinophils Relative 4 %      Basophils Relative 1 %      Neutrophils Absolute 5 74 Thousands/µL      Immature Grans Absolute 0 03 Thousand/uL      Lymphocytes Absolute 1 52 Thousands/µL      Monocytes Absolute 0 48 Thousand/µL      Eosinophils Absolute 0 31 Thousand/µL      Basophils Absolute 0 04 Thousands/µL                  CT abdomen pelvis w contrast   Final Result by Sarah Lopez MD (03/21 1303)      1  A 2 mm left UVJ calculus causes mild to moderate obstructive uropathy  2   Findings suggesting slow small bowel transit without evidence of obstruction  Workstation performed: [id number]               Procedures  Procedures      ED Course  ED Course as of 03/22/23 0828   Tue Mar 21, 2023   1124 Blood, UA(!): Small   1150 eGFR: 61   1150 Sodium: 135   1150 Potassium: 3 7   1304 CT abdomen pelvis w contrast  1  A 2 mm left UVJ calculus causes mild to moderate obstructive uropathy      2  Findings suggesting slow small bowel transit without evidence of obstruction  1326 Patient was re-evaluated  Resting comfortably  Pain resolved  Discussed results and findings with patient  Explained his pain is most likely due to a 2 mm kidney stone    Considered admission but as pain is controlled, there is no MARIPOSA, there are no signs of infection, and stone is small (so likely to pass), can be discharged home  I had a detailed discussion with the patient and/or guardian regarding the historical points, exam findings, and any diagnostic results supporting the discharge diagnosis  Lab results, radiology results, and discharge instructions reviewed with patient and/or family/caregiver  Discussed urology follow-up  Referral sent  Instructions given to return to the emergency department if symptoms worsen or persist, or if there are any questions or concerns that arise at home  Patient and/or guardian verbalized understanding and agreed with the plan of care  Medical Decision Making  Patient is a 74 y o  male who presents to the ED for left flank pain  Abdominal exam without peritoneal signs  No evidence of acute abdomen at this time  In acute distress secondary to pain  Differential diagnosis includes: Nephrolithiasis, pyelonephritis, diverticulitis  Low suspicion for acute hepatobiliary disease (includng acute cholecystitis), acute pancreatitis, PUD (including perforation), acute appendicitis, vascular catastrophe, bowel obstruction or viscus perforation  Presentation not consistent with other acute, emergent causes of abdominal pain at this time  Plan: labs, UA, CT AP, pain control, serial reassessment                 Portions of the record may have been created with voice recognition software  Occasional wrong word or "sound a like" substitutions may have occurred due to the inherent limitations of voice recognition software  Read the chart carefully and recognize, using context, where substitutions have occurred  Hydronephrosis: acute illness or injury  Nephrolithiasis: acute illness or injury  Amount and/or Complexity of Data Reviewed  External Data Reviewed: radiology and notes  Details: Details documented in HPI  Labs: ordered  Decision-making details documented in ED Course  Radiology: ordered   Decision-making details documented in ED Course  Risk  Prescription drug management  Disposition  Final diagnoses:   Nephrolithiasis   Hydronephrosis     Time reflects when diagnosis was documented in both MDM as applicable and the Disposition within this note     Time User Action Codes Description Comment    3/21/2023  1:33 PM Veryl Ruts Add [N20 0] Nephrolithiasis     3/21/2023  1:33 PM Veryl Ruts Add [N13 30] Hydronephrosis       ED Disposition     ED Disposition   Discharge    Condition   Stable    Date/Time   Tue Mar 21, 2023  1:26 PM    Comment   Marissa Griffiths discharge to home/self care                 Follow-up Information     Follow up With Specialties Details Why Contact Info Additional Information    Piter Mackey MD Internal Medicine   [address]  [facility] [id number] [facility] Emergency Department Emergency Medicine  As needed [address] [phone]  [address] Emergency Department, [address], [address]    [address] [facility] [place] [facility] Schedule an appointment as soon as possible for a visit in 1 day  [address] [place] [address] [facility] [place], [address] [place] [address], [address] [place] [address]          Discharge Medication List as of 3/21/2023  2:08 PM      START taking these medications    Details   oxyCODONE (ROXICODONE) 10 MG TABS Take 0 5 tablets (5 mg total) by mouth every 6 (six) hours as needed for moderate pain for up to 10 doses Max Daily Amount: 20 mg, Starting Tue 3/21/2023, Normal      tamsulosin (FLOMAX) 0 4 mg Take 1 capsule (0 4 mg total) by mouth daily with dinner Please stop using after expulsion of your stone, Starting Tue 3/21/2023, Normal         CONTINUE these medications which have NOT CHANGED    Details Aspirin 162 5 MG CP24 Take 81 mg by mouth daily  , Historical Med      atorvastatin (LIPITOR) 10 mg tablet Take 10 mg by mouth daily, Historical Med      chlorthalidone 25 mg tablet Take 25 mg by mouth daily, Historical Med      fluticasone (FLONASE) 50 mcg/act nasal spray as needed, Historical Med      latanoprost (XALATAN) 0 005 % ophthalmic solution INSTILL 1 DROP BY OPHTHALMIC ROUTE INTO INTO BOTH EYES AT BEDTIME, Historical Med      metFORMIN (GLUCOPHAGE) 500 mg tablet Take 500 mg by mouth 2 (two) times a day with meals Once a day, Historical Med      nebivolol (BYSTOLIC) 10 mg tablet Take 10 mg by mouth daily, Historical Med      NIFEdipine ER (ADALAT CC) 30 MG 24 hr tablet Take 30 mg by mouth daily, Historical Med      prednisoLONE acetate (PRED FORTE) 1 % ophthalmic suspension Apply 1 drop to eye, Starting Sat 11/30/2019, Historical Med               PDMP Review     None           ED Provider  Attending physically available and evaluated Marissa Griffiths I managed the patient along with the ED Attending      Electronically Signed by         Brian Plascencia DO  03/22/23 3628

## 2024-02-02 PROCEDURE — 88312 SPECIAL STAINS GROUP 1: CPT | Performed by: SPECIALIST

## 2024-02-02 PROCEDURE — 88307 TISSUE EXAM BY PATHOLOGIST: CPT | Performed by: SPECIALIST

## 2024-02-05 ENCOUNTER — LAB REQUISITION (OUTPATIENT)
Dept: LAB | Facility: HOSPITAL | Age: 69
End: 2024-02-05
Payer: COMMERCIAL

## 2024-02-05 DIAGNOSIS — R22.31 LOCALIZED SWELLING, MASS AND LUMP, RIGHT UPPER LIMB: ICD-10-CM

## 2024-02-07 PROCEDURE — 88312 SPECIAL STAINS GROUP 1: CPT | Performed by: SPECIALIST

## 2024-02-07 PROCEDURE — 88307 TISSUE EXAM BY PATHOLOGIST: CPT | Performed by: SPECIALIST

## 2024-04-01 ENCOUNTER — TRANSCRIBE ORDERS (OUTPATIENT)
Dept: LAB | Facility: CLINIC | Age: 69
End: 2024-04-01

## 2024-04-01 DIAGNOSIS — Z13.9 SCREENING FOR UNSPECIFIED CONDITION: Primary | ICD-10-CM

## 2024-04-02 ENCOUNTER — APPOINTMENT (OUTPATIENT)
Dept: LAB | Facility: CLINIC | Age: 69
End: 2024-04-02
Payer: COMMERCIAL

## 2024-04-02 LAB
ALBUMIN SERPL BCP-MCNC: 3.9 G/DL (ref 3.5–5)
ALP SERPL-CCNC: 67 U/L (ref 34–104)
ALT SERPL W P-5'-P-CCNC: 18 U/L (ref 7–52)
ANION GAP SERPL CALCULATED.3IONS-SCNC: 7 MMOL/L (ref 4–13)
AST SERPL W P-5'-P-CCNC: 18 U/L (ref 13–39)
BILIRUB SERPL-MCNC: 0.61 MG/DL (ref 0.2–1)
BUN SERPL-MCNC: 13 MG/DL (ref 5–25)
CALCIUM SERPL-MCNC: 8.8 MG/DL (ref 8.4–10.2)
CHLORIDE SERPL-SCNC: 103 MMOL/L (ref 96–108)
CHOLEST SERPL-MCNC: 144 MG/DL
CO2 SERPL-SCNC: 28 MMOL/L (ref 21–32)
CREAT SERPL-MCNC: 0.96 MG/DL (ref 0.6–1.3)
GFR SERPL CREATININE-BSD FRML MDRD: 82 ML/MIN/1.73SQ M
GLUCOSE P FAST SERPL-MCNC: 107 MG/DL (ref 65–99)
HDLC SERPL-MCNC: 44 MG/DL
LDLC SERPL CALC-MCNC: 81 MG/DL (ref 0–100)
NONHDLC SERPL-MCNC: 100 MG/DL
POTASSIUM SERPL-SCNC: 4.6 MMOL/L (ref 3.5–5.3)
PROT SERPL-MCNC: 7.7 G/DL (ref 6.4–8.4)
SODIUM SERPL-SCNC: 138 MMOL/L (ref 135–147)
TRIGL SERPL-MCNC: 96 MG/DL

## 2024-04-02 PROCEDURE — 80061 LIPID PANEL: CPT

## 2024-04-02 PROCEDURE — 80053 COMPREHEN METABOLIC PANEL: CPT

## (undated) DEVICE — SPECIMEN CONTAINER STERILE PEEL PACK

## (undated) DEVICE — CATH FOLEY 12FR 5ML 2 WAY SILICONE ELASTIMER

## (undated) DEVICE — LASER HOLMIUM FIBER 365 MIC

## (undated) DEVICE — SINGLE-USE POLYPECTOMY SNARE: Brand: ROTATABLE SNARE

## (undated) DEVICE — CATH URETERAL 5FR X 70 CM FLEX TIP POLYUR BARD

## (undated) DEVICE — PACK TUR

## (undated) DEVICE — 3M™ TEGADERM™ TRANSPARENT FILM DRESSING FRAME STYLE, 1626W, 4 IN X 4-3/4 IN (10 CM X 12 CM), 50/CT 4CT/CASE: Brand: 3M™ TEGADERM™

## (undated) DEVICE — GLOVE SRG BIOGEL ECLIPSE 7.5

## (undated) DEVICE — GUIDEWIRE STRGHT TIP 0.035 IN  SOLO PLUS

## (undated) DEVICE — SYRINGE 10ML LL

## (undated) DEVICE — WORKING LENGTH 235CM, WORKING CHANNEL 2.8MM: Brand: RESOLUTION 360 CLIP

## (undated) DEVICE — ENDOSCOPIC VALVE WITH ADAPTER.: Brand: SURSEAL® II